# Patient Record
Sex: MALE | Race: WHITE | Employment: UNEMPLOYED | URBAN - METROPOLITAN AREA
[De-identification: names, ages, dates, MRNs, and addresses within clinical notes are randomized per-mention and may not be internally consistent; named-entity substitution may affect disease eponyms.]

---

## 2019-06-13 ENCOUNTER — HOSPITAL ENCOUNTER (INPATIENT)
Age: 64
LOS: 12 days | Discharge: HOME | DRG: 357 | End: 2019-06-26
Attending: EMERGENCY MEDICINE | Admitting: SURGERY
Payer: COMMERCIAL

## 2019-06-13 ENCOUNTER — APPOINTMENT (OUTPATIENT)
Dept: CT IMAGING | Age: 64
DRG: 357 | End: 2019-06-13
Attending: EMERGENCY MEDICINE
Payer: COMMERCIAL

## 2019-06-13 DIAGNOSIS — R10.84 ABDOMINAL PAIN, GENERALIZED: Primary | ICD-10-CM

## 2019-06-13 DIAGNOSIS — I71.40 ABDOMINAL AORTIC ANEURYSM (AAA) WITHOUT RUPTURE: ICD-10-CM

## 2019-06-13 DIAGNOSIS — K56.609 SMALL BOWEL OBSTRUCTION (HCC): ICD-10-CM

## 2019-06-13 PROBLEM — D72.829 LEUKOCYTOSIS: Status: ACTIVE | Noted: 2019-06-13

## 2019-06-13 PROBLEM — K52.9 ENTERITIS: Status: ACTIVE | Noted: 2019-06-13

## 2019-06-13 LAB
ALBUMIN SERPL-MCNC: 4.2 G/DL (ref 3.2–4.6)
ALBUMIN/GLOB SERPL: 1.5 {RATIO} (ref 1.2–3.5)
ALP SERPL-CCNC: 85 U/L (ref 50–136)
ALT SERPL-CCNC: 23 U/L (ref 12–65)
ANION GAP SERPL CALC-SCNC: 5 MMOL/L (ref 7–16)
AST SERPL-CCNC: 13 U/L (ref 15–37)
BASOPHILS # BLD: 0.1 K/UL (ref 0–0.2)
BASOPHILS NFR BLD: 1 % (ref 0–2)
BILIRUB SERPL-MCNC: 0.4 MG/DL (ref 0.2–1.1)
BUN SERPL-MCNC: 20 MG/DL (ref 8–23)
CALCIUM SERPL-MCNC: 9 MG/DL (ref 8.3–10.4)
CHLORIDE SERPL-SCNC: 106 MMOL/L (ref 98–107)
CO2 SERPL-SCNC: 30 MMOL/L (ref 21–32)
CREAT SERPL-MCNC: 1.08 MG/DL (ref 0.8–1.5)
DIFFERENTIAL METHOD BLD: ABNORMAL
EOSINOPHIL # BLD: 0.2 K/UL (ref 0–0.8)
EOSINOPHIL NFR BLD: 2 % (ref 0.5–7.8)
ERYTHROCYTE [DISTWIDTH] IN BLOOD BY AUTOMATED COUNT: 13.4 % (ref 11.9–14.6)
GLOBULIN SER CALC-MCNC: 2.8 G/DL (ref 2.3–3.5)
GLUCOSE SERPL-MCNC: 96 MG/DL (ref 65–100)
HCT VFR BLD AUTO: 46.1 % (ref 41.1–50.3)
HGB BLD-MCNC: 14.7 G/DL (ref 13.6–17.2)
IMM GRANULOCYTES # BLD AUTO: 0.1 K/UL (ref 0–0.5)
IMM GRANULOCYTES NFR BLD AUTO: 1 % (ref 0–5)
LYMPHOCYTES # BLD: 2.2 K/UL (ref 0.5–4.6)
LYMPHOCYTES NFR BLD: 17 % (ref 13–44)
MCH RBC QN AUTO: 30.6 PG (ref 26.1–32.9)
MCHC RBC AUTO-ENTMCNC: 31.9 G/DL (ref 31.4–35)
MCV RBC AUTO: 96 FL (ref 79.6–97.8)
MONOCYTES # BLD: 1 K/UL (ref 0.1–1.3)
MONOCYTES NFR BLD: 8 % (ref 4–12)
NEUTS SEG # BLD: 9.2 K/UL (ref 1.7–8.2)
NEUTS SEG NFR BLD: 72 % (ref 43–78)
NRBC # BLD: 0 K/UL (ref 0–0.2)
PLATELET # BLD AUTO: 283 K/UL (ref 150–450)
PMV BLD AUTO: 10.6 FL (ref 9.4–12.3)
POTASSIUM SERPL-SCNC: 4.1 MMOL/L (ref 3.5–5.1)
PROT SERPL-MCNC: 7 G/DL (ref 6.3–8.2)
RBC # BLD AUTO: 4.8 M/UL (ref 4.23–5.6)
SODIUM SERPL-SCNC: 141 MMOL/L (ref 136–145)
WBC # BLD AUTO: 12.7 K/UL (ref 4.3–11.1)

## 2019-06-13 PROCEDURE — 77030008771 HC TU NG SALEM SUMP -A

## 2019-06-13 PROCEDURE — 74011000258 HC RX REV CODE- 258: Performed by: EMERGENCY MEDICINE

## 2019-06-13 PROCEDURE — 99284 EMERGENCY DEPT VISIT MOD MDM: CPT | Performed by: EMERGENCY MEDICINE

## 2019-06-13 PROCEDURE — 0D9670Z DRAINAGE OF STOMACH WITH DRAINAGE DEVICE, VIA NATURAL OR ARTIFICIAL OPENING: ICD-10-PCS | Performed by: EMERGENCY MEDICINE

## 2019-06-13 PROCEDURE — 96374 THER/PROPH/DIAG INJ IV PUSH: CPT | Performed by: EMERGENCY MEDICINE

## 2019-06-13 PROCEDURE — 74011250636 HC RX REV CODE- 250/636: Performed by: EMERGENCY MEDICINE

## 2019-06-13 PROCEDURE — 96375 TX/PRO/DX INJ NEW DRUG ADDON: CPT | Performed by: EMERGENCY MEDICINE

## 2019-06-13 PROCEDURE — 74177 CT ABD & PELVIS W/CONTRAST: CPT

## 2019-06-13 PROCEDURE — 74011250636 HC RX REV CODE- 250/636: Performed by: SURGERY

## 2019-06-13 PROCEDURE — 74011636320 HC RX REV CODE- 636/320: Performed by: EMERGENCY MEDICINE

## 2019-06-13 PROCEDURE — 85025 COMPLETE CBC W/AUTO DIFF WBC: CPT

## 2019-06-13 PROCEDURE — 80053 COMPREHEN METABOLIC PANEL: CPT

## 2019-06-13 RX ORDER — SODIUM CHLORIDE 0.9 % (FLUSH) 0.9 %
10 SYRINGE (ML) INJECTION
Status: COMPLETED | OUTPATIENT
Start: 2019-06-13 | End: 2019-06-13

## 2019-06-13 RX ORDER — METRONIDAZOLE 500 MG/100ML
500 INJECTION, SOLUTION INTRAVENOUS EVERY 6 HOURS
Status: DISCONTINUED | OUTPATIENT
Start: 2019-06-13 | End: 2019-06-20

## 2019-06-13 RX ORDER — ONDANSETRON 2 MG/ML
4 INJECTION INTRAMUSCULAR; INTRAVENOUS
Status: COMPLETED | OUTPATIENT
Start: 2019-06-13 | End: 2019-06-13

## 2019-06-13 RX ORDER — MORPHINE SULFATE 4 MG/ML
4 INJECTION INTRAVENOUS
Status: COMPLETED | OUTPATIENT
Start: 2019-06-13 | End: 2019-06-13

## 2019-06-13 RX ORDER — CIPROFLOXACIN 2 MG/ML
400 INJECTION, SOLUTION INTRAVENOUS EVERY 12 HOURS
Status: DISCONTINUED | OUTPATIENT
Start: 2019-06-13 | End: 2019-06-20

## 2019-06-13 RX ADMIN — ONDANSETRON 4 MG: 2 INJECTION INTRAMUSCULAR; INTRAVENOUS at 22:29

## 2019-06-13 RX ADMIN — IOPAMIDOL 100 ML: 755 INJECTION, SOLUTION INTRAVENOUS at 22:35

## 2019-06-13 RX ADMIN — MORPHINE SULFATE 4 MG: 4 INJECTION INTRAVENOUS at 22:30

## 2019-06-13 RX ADMIN — METRONIDAZOLE 500 MG: 500 INJECTION, SOLUTION INTRAVENOUS at 23:58

## 2019-06-13 RX ADMIN — DIATRIZOATE MEGLUMINE AND DIATRIZOATE SODIUM 15 ML: 600; 100 SOLUTION ORAL; RECTAL at 21:37

## 2019-06-13 RX ADMIN — SODIUM CHLORIDE 1000 ML: 900 INJECTION, SOLUTION INTRAVENOUS at 21:37

## 2019-06-13 RX ADMIN — Medication 10 ML: at 22:35

## 2019-06-14 ENCOUNTER — APPOINTMENT (OUTPATIENT)
Dept: GENERAL RADIOLOGY | Age: 64
DRG: 357 | End: 2019-06-14
Attending: SURGERY
Payer: COMMERCIAL

## 2019-06-14 PROBLEM — K44.9 HIATAL HERNIA: Status: ACTIVE | Noted: 2019-06-14

## 2019-06-14 PROBLEM — I71.40 ABDOMINAL AORTIC ANEURYSM (AAA) WITHOUT RUPTURE: Status: ACTIVE | Noted: 2019-06-14

## 2019-06-14 LAB
ANION GAP SERPL CALC-SCNC: 5 MMOL/L (ref 7–16)
BUN SERPL-MCNC: 14 MG/DL (ref 8–23)
CALCIUM SERPL-MCNC: 8.3 MG/DL (ref 8.3–10.4)
CHLORIDE SERPL-SCNC: 106 MMOL/L (ref 98–107)
CO2 SERPL-SCNC: 28 MMOL/L (ref 21–32)
CREAT SERPL-MCNC: 0.93 MG/DL (ref 0.8–1.5)
CRP SERPL-MCNC: 0.6 MG/DL (ref 0–0.9)
ERYTHROCYTE [DISTWIDTH] IN BLOOD BY AUTOMATED COUNT: 13.5 % (ref 11.9–14.6)
GLUCOSE SERPL-MCNC: 114 MG/DL (ref 65–100)
HCT VFR BLD AUTO: 46.2 % (ref 41.1–50.3)
HGB BLD-MCNC: 14.7 G/DL (ref 13.6–17.2)
LACTATE SERPL-SCNC: 0.8 MMOL/L (ref 0.4–2)
MCH RBC QN AUTO: 30.6 PG (ref 26.1–32.9)
MCHC RBC AUTO-ENTMCNC: 31.8 G/DL (ref 31.4–35)
MCV RBC AUTO: 96 FL (ref 79.6–97.8)
NRBC # BLD: 0 K/UL (ref 0–0.2)
PLATELET # BLD AUTO: 258 K/UL (ref 150–450)
PMV BLD AUTO: 10.5 FL (ref 9.4–12.3)
POTASSIUM SERPL-SCNC: 4.2 MMOL/L (ref 3.5–5.1)
RBC # BLD AUTO: 4.81 M/UL (ref 4.23–5.6)
SODIUM SERPL-SCNC: 139 MMOL/L (ref 136–145)
WBC # BLD AUTO: 14.2 K/UL (ref 4.3–11.1)

## 2019-06-14 PROCEDURE — 77030032490 HC SLV COMPR SCD KNE COVD -B

## 2019-06-14 PROCEDURE — 74011250637 HC RX REV CODE- 250/637: Performed by: SURGERY

## 2019-06-14 PROCEDURE — 85027 COMPLETE CBC AUTOMATED: CPT

## 2019-06-14 PROCEDURE — 86580 TB INTRADERMAL TEST: CPT | Performed by: SURGERY

## 2019-06-14 PROCEDURE — 74018 RADEX ABDOMEN 1 VIEW: CPT

## 2019-06-14 PROCEDURE — 77030027138 HC INCENT SPIROMETER -A

## 2019-06-14 PROCEDURE — 74011250636 HC RX REV CODE- 250/636: Performed by: SURGERY

## 2019-06-14 PROCEDURE — 36415 COLL VENOUS BLD VENIPUNCTURE: CPT

## 2019-06-14 PROCEDURE — 86140 C-REACTIVE PROTEIN: CPT

## 2019-06-14 PROCEDURE — 83605 ASSAY OF LACTIC ACID: CPT

## 2019-06-14 PROCEDURE — 65270000029 HC RM PRIVATE

## 2019-06-14 PROCEDURE — 74011000302 HC RX REV CODE- 302: Performed by: SURGERY

## 2019-06-14 PROCEDURE — 77030033269 HC SLV COMPR SCD KNE2 CARD -B

## 2019-06-14 PROCEDURE — 80048 BASIC METABOLIC PNL TOTAL CA: CPT

## 2019-06-14 RX ORDER — DEXTROSE, SODIUM CHLORIDE, AND POTASSIUM CHLORIDE 5; .45; .15 G/100ML; G/100ML; G/100ML
100 INJECTION INTRAVENOUS CONTINUOUS
Status: DISCONTINUED | OUTPATIENT
Start: 2019-06-14 | End: 2019-06-18

## 2019-06-14 RX ORDER — MORPHINE SULFATE 10 MG/ML
2-4 INJECTION, SOLUTION INTRAMUSCULAR; INTRAVENOUS
Status: DISCONTINUED | OUTPATIENT
Start: 2019-06-14 | End: 2019-06-26 | Stop reason: HOSPADM

## 2019-06-14 RX ORDER — ACETAMINOPHEN 500 MG
1000 TABLET ORAL
Status: DISCONTINUED | OUTPATIENT
Start: 2019-06-14 | End: 2019-06-26 | Stop reason: HOSPADM

## 2019-06-14 RX ORDER — ONDANSETRON 2 MG/ML
4 INJECTION INTRAMUSCULAR; INTRAVENOUS
Status: DISCONTINUED | OUTPATIENT
Start: 2019-06-14 | End: 2019-06-26 | Stop reason: HOSPADM

## 2019-06-14 RX ORDER — ENOXAPARIN SODIUM 100 MG/ML
40 INJECTION SUBCUTANEOUS EVERY 24 HOURS
Status: DISCONTINUED | OUTPATIENT
Start: 2019-06-15 | End: 2019-06-26 | Stop reason: HOSPADM

## 2019-06-14 RX ORDER — CETIRIZINE HCL 10 MG
10 TABLET ORAL DAILY
COMMUNITY

## 2019-06-14 RX ORDER — MORPHINE SULFATE 10 MG/ML
2-6 INJECTION, SOLUTION INTRAMUSCULAR; INTRAVENOUS
Status: DISCONTINUED | OUTPATIENT
Start: 2019-06-14 | End: 2019-06-14

## 2019-06-14 RX ORDER — FAMOTIDINE 10 MG/ML
20 INJECTION INTRAVENOUS EVERY 12 HOURS
Status: DISCONTINUED | OUTPATIENT
Start: 2019-06-14 | End: 2019-06-18

## 2019-06-14 RX ADMIN — METRONIDAZOLE 500 MG: 500 INJECTION, SOLUTION INTRAVENOUS at 11:03

## 2019-06-14 RX ADMIN — METRONIDAZOLE 500 MG: 500 INJECTION, SOLUTION INTRAVENOUS at 06:10

## 2019-06-14 RX ADMIN — CHLORASEPTIC 1 SPRAY: 1.5 LIQUID ORAL at 08:47

## 2019-06-14 RX ADMIN — MORPHINE SULFATE 2 MG: 10 INJECTION, SOLUTION INTRAMUSCULAR; INTRAVENOUS at 15:25

## 2019-06-14 RX ADMIN — CIPROFLOXACIN 400 MG: 2 INJECTION, SOLUTION INTRAVENOUS at 13:55

## 2019-06-14 RX ADMIN — DEXTROSE MONOHYDRATE, SODIUM CHLORIDE, AND POTASSIUM CHLORIDE 100 ML/HR: 50; 4.5; 1.49 INJECTION, SOLUTION INTRAVENOUS at 02:44

## 2019-06-14 RX ADMIN — FAMOTIDINE 20 MG: 10 INJECTION, SOLUTION INTRAVENOUS at 02:45

## 2019-06-14 RX ADMIN — ONDANSETRON 4 MG: 2 INJECTION INTRAMUSCULAR; INTRAVENOUS at 08:37

## 2019-06-14 RX ADMIN — MORPHINE SULFATE 2 MG: 10 INJECTION, SOLUTION INTRAMUSCULAR; INTRAVENOUS at 19:07

## 2019-06-14 RX ADMIN — TUBERCULIN PURIFIED PROTEIN DERIVATIVE 5 UNITS: 5 INJECTION, SOLUTION INTRADERMAL at 02:46

## 2019-06-14 RX ADMIN — FAMOTIDINE 20 MG: 10 INJECTION, SOLUTION INTRAVENOUS at 13:55

## 2019-06-14 RX ADMIN — CIPROFLOXACIN 400 MG: 2 INJECTION, SOLUTION INTRAVENOUS at 02:45

## 2019-06-14 RX ADMIN — DEXTROSE MONOHYDRATE, SODIUM CHLORIDE, AND POTASSIUM CHLORIDE 100 ML/HR: 50; 4.5; 1.49 INJECTION, SOLUTION INTRAVENOUS at 11:04

## 2019-06-14 RX ADMIN — METRONIDAZOLE 500 MG: 500 INJECTION, SOLUTION INTRAVENOUS at 17:15

## 2019-06-14 NOTE — ED PROVIDER NOTES
29-year-old white male with no ongoing medical problems presents with lower abdominal discomfort onset around 2:30 today. Reports he has not eaten anything since breakfast.  Aggravated by palpation and movement. No vomiting or fever. Was seen at urgent care and sent here for imaging. The history is provided by the patient. Abdominal Pain    Pertinent negatives include no fever, no diarrhea, no nausea, no vomiting, no dysuria, no headaches, no chest pain and no back pain.         Past Medical History:   Diagnosis Date    Arteritis, unspecified ([facility] [address]     Asthma     Body mass index between 19-24, adult     Bronchiolitis     CA in situ skin     Chronic airway obstruction, not elsewhere classified ([facility] [address] 5/26/2015    Coccyx disorder     Colon polyps     Crushing injury     Dermatitis     dermatitis/urticaria    ED (erectile dysfunction)     Esophageal reflux 5/26/2015    Herpes zoster     Impotence of organic origin     Lumbar back pain     Pain in limb     Reflux esophagitis     Skin disorder     Tobacco use disorder 5/26/2015       Past Surgical History:   Procedure Laterality Date    HX HEART CATHETERIZATION  1999    HX HEMORRHOIDECTOMY  1999         Family History:   Problem Relation Age of Onset    Stroke Father         CVA    Heart Disease Father     Cancer Mother         Lung       Social History     Socioeconomic History    Marital status:      Spouse name: Not on file    Number of children: Not on file    Years of education: Not on file    Highest education level: Not on file   Occupational History    Not on file   Social Needs    Financial resource strain: Not on file    Food insecurity:     Worry: Not on file     Inability: Not on file    Transportation needs:     Medical: Not on file     Non-medical: Not on file   Tobacco Use    Smoking status: Current Every Day Smoker     Packs/day: 1.00     Years: 42.00     Pack years: 42.00    Smokeless tobacco: Former User Quit date: 1/1/1991   Substance and Sexual Activity    Alcohol use: No    Drug use: No    Sexual activity: Not on file   Lifestyle    Physical activity:     Days per week: Not on file     Minutes per session: Not on file    Stress: Not on file   Relationships    Social connections:     Talks on phone: Not on file     Gets together: Not on file     Attends [employer] witness service: Not on file     Active member of club or organization: Not on file     Attends meetings of clubs or organizations: Not on file     Relationship status: Not on file    Intimate partner violence:     Fear of current or ex partner: Not on file     Emotionally abused: Not on file     Physically abused: Not on file     Forced sexual activity: Not on file   Other Topics Concern    Not on file   Social History Narrative    Not on file         ALLERGIES: Patient has no known allergies. Review of Systems   Constitutional: Negative for fever. HENT: Negative for congestion. Respiratory: Negative for cough. Cardiovascular: Negative for chest pain. Gastrointestinal: Positive for abdominal pain. Negative for diarrhea, nausea and vomiting. Genitourinary: Negative for dysuria. Musculoskeletal: Negative for back pain and neck pain. Skin: Negative for rash. Neurological: Negative for headaches. Vitals:    06/13/19 2013   BP: 162/85   Pulse: 61   Resp: 18   Temp: 97.6 °F (36.4 °C)   SpO2: 97%   Weight: 80.3 kg (177 lb)   Height: 6' 1\" (1.854 m)            Physical Exam   Constitutional: He is oriented to person, place, and time. He appears well-developed and well-nourished. No distress. HENT:   Head: Normocephalic and atraumatic. Mouth/Throat: Oropharynx is clear and moist.   Eyes: Pupils are equal, round, and reactive to light. Conjunctivae are normal. No scleral icterus. Neck: Normal range of motion. Neck supple. Cardiovascular: Normal rate and regular rhythm.    Pulmonary/Chest: Effort normal and breath sounds normal. Abdominal: Soft. There is tenderness in the right lower quadrant, suprapubic area and left lower quadrant. There is guarding. There is no rigidity and no rebound. Musculoskeletal: Normal range of motion. He exhibits no edema. Neurological: He is alert and oriented to person, place, and time. Skin: Skin is warm and dry. Psychiatric: He has a normal mood and affect. His behavior is normal.   Nursing note and vitals reviewed. MDM  Number of Diagnoses or Management Options  Diagnosis management comments: Lab work shows mild leukocytosis 12.7 but is otherwise unremarkable. CT scan shows 15 cm segment of small bowel with thickened wall and a mesenteric hyperemia. Findings possibly due to acute enteritis. Her proximal small bowel loops that are distended suggesting obstruction. No abscess or free air. Patient initially declining pain medicine however after drinking contrast began having significant pain. He was treated with morphine and Zofran and IV fluids. He is still uncomfortable. On repeat exam he still has some guarding. No rebound or rigidity. Findings discussed with surgery.        Amount and/or Complexity of Data Reviewed  Clinical lab tests: ordered and reviewed  Tests in the radiology section of CPT®: ordered and reviewed  Independent visualization of images, tracings, or specimens: yes    Risk of Complications, Morbidity, and/or Mortality  Presenting problems: moderate  Diagnostic procedures: moderate  Management options: moderate           Procedures

## 2019-06-14 NOTE — PROGRESS NOTES
Chart screened by  for discharge planning. No needs identified at this time. Please consult  if any new issues arise.     Care Management Interventions  Transition of Care Consult (CM Consult): Discharge Planning  Discharge Location  Discharge Placement: Unable to determine at this time

## 2019-06-14 NOTE — ED TRIAGE NOTES
C/o abdominal pain, located at umbilicus. Onset approx 1500 today. Denies n/v/d. Denies fever or chills. Denies urinary symptoms. Denies blood to stool. Denies hx of same. Reports as intermittent since onset. Denies attempting pain meds pta. Seen at [facility] p[facility], sent to ed.

## 2019-06-14 NOTE — PROGRESS NOTES
TRANSFER - IN REPORT:    Verbal report received from Yuliya Phan RN on Elchan Mow  being received from ED for routine progression of care      Report consisted of patients Situation, Background, Assessment and   Recommendations(SBAR). Information from the following report(s) SBAR, Kardex, ED Summary, [facility] and Recent Results was reviewed with the receiving nurse. Opportunity for questions and clarification was provided. Assessment will be completed upon patients arrival to unit and care assumed.

## 2019-06-14 NOTE — CONSULTS
[facility] Consult Note       Primary GI Physician: None    Referring Provider:  Dr Fidel Norwood Date:  6/14/2019    Admit Date:  6/13/2019    Chief Complaint:  Small bowel enteritis with obstruction    Subjective:     History of Present Illness:  Patient is a 58 y.o. male with PMH including but not limited to COPD, skin ca, colon polyps and GERD who is seen in consultation at the request of Dr. Chantelle Dodge for above. He reports normal daily stools until the past several days and then only moving his bowels every other day. He reports 2 prior colonoscopies the last of which was 10 years ago with 2 colon polyps. He reported sudden onset severe pain beginning this am about 2 am.  He had no nausea until he drank CT contrast.  Denies BRRB or melena. GERD well controlled on Omeprazole 40 mg daily. Denies family hx of GI malignancy or IBD. Has stable COPD not on home oxygen. Card cath in 1999 was negative. Labs notable only for elevated WBC of 12.7. Has NG in place on LIS. On Cipro and Flagyl. CT as below with 15 cm segment of SB wall thickening in the pelvis. 6/13/19 CT abd/pelvis with PO and IV contrast  - Liver: Within normal limits. - Gallbladder and bile ducts: Within normal limits. - Spleen: Within normal limits. - Urinary tract: Within normal limits. - Adrenals: Within normal limits. - Pancreas: Within normal limits. - Gastrointestinal tract: There is an approximate 15 cm long segment of small  bowel wall thickening in the pelvis, in the region of the distal ileum, with  mesenteric hyperemia and trace free pelvic fluid. Findings are consistent with  acute enteritis. The more proximal small bowel loops are mildly distended,  suggesting a component of underlying obstruction as well. The terminal ileum has  a normal appearance. The colon is unremarkable. The appendix is not definitely  identified.  No focal abscess or free air is identified.  - Retroperitoneum: There is a 3.2 cm fusiform infrarenal abdominal aortic  aneurysm, without evidence of dissection or rupture. - Peritoneal cavity and abdominal wall: Within normal limits. - Pelvis: Within normal limits. - Spine/bones: No acute process. - Other comments: The lung bases are clear. There is a small hiatal hernia.     IMPRESSION:   1. Acute enteritis involving an approximately 15 cm long segment of pelvic small  bowel, with an associated mild small bowel obstruction. There is trace free  fluid in the pelvis as well.  2. 3.2 cm infrarenal abdominal aortic aneurysm. 3. Small hiatal hernia        PMH:  Past Medical History:   Diagnosis Date    Arteritis, unspecified ([facility] [address]     Asthma     Body mass index between 19-24, adult     Bronchiolitis     CA in situ skin     Chronic airway obstruction, not elsewhere classified ([facility] [address] 5/26/2015    Coccyx disorder     Colon polyps     Crushing injury     Dermatitis     dermatitis/urticaria    ED (erectile dysfunction)     Esophageal reflux 5/26/2015    Herpes zoster     Impotence of organic origin     Lumbar back pain     Pain in limb     Reflux esophagitis     Skin disorder     Tobacco use disorder 5/26/2015       PSH:  Past Surgical History:   Procedure Laterality Date    HX HEART CATHETERIZATION  1999    HX HEMORRHOIDECTOMY  1999       Allergies:  No Known Allergies    Home Medications:  Prior to Admission medications    Medication Sig Start Date End Date Taking? Authorizing Provider   guaiFENesin-codeine (ROBITUSSIN AC) 100-10 mg/5 mL solution 1-2 tsp q 4-6 hours prn cough. May cause drowsiness 12/30/16   Adam Hansen PA-C   fluticasone [facility]) 50 mcg/actuation nasal spray 1-2 sprays EN daily 12/30/16   Eugene Sera VIVIANA PARRISH   albuterol (PROVENTIL HFA, VENTOLIN HFA, PROAIR HFA) 90 mcg/actuation inhaler 1-2 puffs q 4-6 hours prn wheezing 9/2/16   Eugene Sera VIVIANA PARRISH   omeprazole (PRILOSEC) 40 mg capsule Take 40 mg by mouth daily.     Provider, Historical   aspirin (ASPIRIN CHILDRENS) 81 mg chewable tablet Take 81 mg by mouth daily. Provider, Historical       Hospital Medications:  Current Facility-Administered Medications   Medication Dose Route Frequency    famotidine (PF) (PEPCID) injection 20 mg  20 mg IntraVENous Q12H    [START ON 6/15/2019] enoxaparin (LOVENOX) injection 40 mg  40 mg SubCUTAneous Q24H    acetaminophen (TYLENOL) tablet 1,000 mg  1,000 mg Oral Q6H PRN    ondansetron (ZOFRAN) injection 4 mg  4 mg IntraVENous Q4H PRN    dextrose 5% - 0.45% NaCl with KCl 20 mEq/L infusion  100 mL/hr IntraVENous CONTINUOUS    morphine 10 mg/ml injection 2-4 mg  2-4 mg IntraVENous Q1H PRN    phenol throat spray (CHLORASEPTIC) 1 Spray  1 Spray Oral PRN    tuberculin injection 5 Units  5 Units IntraDERMal ONCE    sodium chloride 0.9 % bolus infusion 100 mL  100 mL IntraVENous RAD ONCE    ciprofloxacin (CIPRO) 400 mg in D5W IVPB (premix)  400 mg IntraVENous Q12H    metroNIDAZOLE (FLAGYL) IVPB premix 500 mg  500 mg IntraVENous Q6H       Social History:  Social History     Tobacco Use    Smoking status: Current Every Day Smoker     Packs/day: 1.00     Years: 42.00     Pack years: 42.00    Smokeless tobacco: Former User     Quit date: 1/1/1991   Substance Use Topics    Alcohol use: No       Pt denies any history of drug use, blood transfusions, or tattoos. Family History:  Family History   Problem Relation Age of Onset    Stroke Father         CVA    Heart Disease Father     Cancer Mother         Lung       Review of Systems:  A detailed 10 system ROS is obtained, with pertinent positives as listed above. All others are negative. Diet:  NPO    Objective:     Physical Exam:  Vitals:  Visit Vitals  /80   Pulse 70   Temp 99.4 °F (37.4 °C)   Resp 16   Ht 6' 1\" (1.854 m)   Wt 80.3 kg (177 lb)   SpO2 92%   BMI 23.35 kg/m²     Gen:  Pt is alert, cooperative, no acute distress  Skin:  Extremities and face reveal no rashes.    HEENT: Sclerae anicteric. Extra-occular muscles are intact. No oral ulcers. No abnormal pigmentation of the lips. The neck is supple. Cardiovascular: Regular rate and rhythm. No murmurs, gallops, or rubs. Respiratory:  Comfortable breathing with no accessory muscle use. Clear breath sounds anteriorly with no wheezes, rales, or rhonchi. GI:  Abdomen nondistended, soft. Mod ttp in mid and upper abd. Very hypoactive BS. Rectal:  Deferred  Musculoskeletal:  No pitting edema of the lower legs. Neurological:  Gross memory appears intact. Patient is alert and oriented. Psychiatric:  Mood appears appropriate with judgement intact. Laboratory:    Recent Labs     06/13/19 2016   WBC 12.7*   HGB 14.7   HCT 46.1      MCV 96.0      K 4.1      CO2 30   BUN 20   CREA 1.08   CA 9.0   GLU 96   AP 85   SGOT 13*   ALT 23   TBILI 0.4   ALB 4.2   TP 7.0          Assessment:     Principal Problem:    Small Bowel Enteritis on CT (6/13/2019)    Active Problems:    Tobacco use disorder (5/26/2015)      Generalized abdominal pain (6/13/2019)      SBO (small bowel obstruction) ([facility] [address] (6/13/2019)      Leukocytosis (6/13/2019)      3.2cm AAA on CT 6/13/19 (6/14/2019)      Hiatal hernia (6/14/2019)      58 y.o. male with PMH including but not limited to COPD, skin ca, colon polyps and GERD admitted w sudden onset mid abd pain w CT w 15 cm segment of SB wall thickening in the pelvis, elevated WBC. Last colo 8 yo w polyps. No diarrhea, BRRB or tarry stools prior to admit    Plan:     - Supportive care of SBO with NG, NPO  - Elevated ABD with CIpro and Flagyl  - Will check inflammatory markers  - Could be acute infection, inflammation (IBD), ischemic    Lin Mcdermott, MAEVE  Patient is seen and examined in collaboration with Dr. Vito Jimenez. Assessment and plan as per Dr. Xenia Reardon. I have seen and examined this patient, and agree with above assessment and plan.     Will eventually require colonoscopy to rule out Crohn's disease.  This will be after SBO resolves to allow adequate bowel prep, Possibly even as outpatient   Continue supportive care in the meantime as above   Small flatus today, feeling somewhat improved     David Jones MD

## 2019-06-14 NOTE — PROGRESS NOTES
06/14/19 0120   Dual Skin Pressure Injury Assessment   Dual Skin Pressure Injury Assessment WDL   Second Care Provider (Based on [address] [place] [address]) Madelaine Martin, RN    Skin Integumentary   Skin Integumentary (WDL) WDL   Skin Condition/Temp Dry;Flaky; Warm;Rash   Skin Integrity Intact

## 2019-06-14 NOTE — H&P
H&P/Consult Note/Progress Note/Office Note:   Jany Benton  MRN: [id number]  :1955  Age:63 y.o.    HPI: Jany Benton is a 58 y.o. male who came to the ER with a 1 day h/o progressive, constant, severe, non-radiating, generalized abd pain  Nothing made pain better or worse. No associated N/V/F/C or diarrhea  Normally has 1-2 solid BMs qday but lately every other day    WBC 12.7k  Surgery consulted after CT showed acute enteritis with component of underlying obstrauction  No h/o Crohn's or lymphoma  He is s/p appy in 19 CT abd/pelvis with PO and IV contrast  - Liver: Within normal limits. - Gallbladder and bile ducts: Within normal limits. - Spleen: Within normal limits. - Urinary tract: Within normal limits. - Adrenals: Within normal limits. - Pancreas: Within normal limits. - Gastrointestinal tract: There is an approximate 15 cm long segment of small  bowel wall thickening in the pelvis, in the region of the distal ileum, with  mesenteric hyperemia and trace free pelvic fluid. Findings are consistent with  acute enteritis. The more proximal small bowel loops are mildly distended,  suggesting a component of underlying obstruction as well. The terminal ileum has  a normal appearance. The colon is unremarkable. The appendix is not definitely  identified. No focal abscess or free air is identified.  - Retroperitoneum: There is a 3.2 cm fusiform infrarenal abdominal aortic  aneurysm, without evidence of dissection or rupture. - Peritoneal cavity and abdominal wall: Within normal limits. - Pelvis: Within normal limits. - Spine/bones: No acute process. - Other comments: The lung bases are clear. There is a small hiatal hernia. IMPRESSION:   1. Acute enteritis involving an approximately 15 cm long segment of pelvic small  bowel, with an associated mild small bowel obstruction.  There is trace free  fluid in the pelvis as well.  2. 3.2 cm infrarenal abdominal aortic aneurysm. 3. Small hiatal hernia          Past Medical History:   Diagnosis Date    Arteritis, unspecified ([facility]     Asthma     Body mass index between 19-24, adult     Bronchiolitis     CA in situ skin     Chronic airway obstruction, not elsewhere classified ([facility] 5/26/2015    Coccyx disorder     Colon polyps     Crushing injury     Dermatitis     dermatitis/urticaria    ED (erectile dysfunction)     Esophageal reflux 5/26/2015    Herpes zoster     Impotence of organic origin     Lumbar back pain     Pain in limb     Reflux esophagitis     Skin disorder     Tobacco use disorder 5/26/2015     Past Surgical History:   Procedure Laterality Date    HX HEART CATHETERIZATION  1999    HX HEMORRHOIDECTOMY  1999     Current Facility-Administered Medications   Medication Dose Route Frequency    sodium chloride 0.9 % bolus infusion 100 mL  100 mL IntraVENous RAD ONCE    ciprofloxacin (CIPRO) 400 mg in D5W IVPB (premix)  400 mg IntraVENous Q12H    metroNIDAZOLE (FLAGYL) IVPB premix 500 mg  500 mg IntraVENous Q6H     Current Outpatient Medications   Medication Sig    guaiFENesin-codeine (ROBITUSSIN AC) 100-10 mg/5 mL solution 1-2 tsp q 4-6 hours prn cough. May cause drowsiness    fluticasone (FLONASE) 50 mcg/actuation nasal spray 1-2 sprays EN daily    albuterol (PROVENTIL HFA, VENTOLIN HFA, PROAIR HFA) 90 mcg/actuation inhaler 1-2 puffs q 4-6 hours prn wheezing    omeprazole (PRILOSEC) 40 mg capsule Take 40 mg by mouth daily.  aspirin (ASPIRIN CHILDRENS) 81 mg chewable tablet Take 81 mg by mouth daily. Patient has no known allergies.   Social History     Socioeconomic History    Marital status:      Spouse name: Not on file    Number of children: Not on file    Years of education: Not on file    Highest education level: Not on file   Tobacco Use    Smoking status: Current Every Day Smoker     Packs/day: 1.00     Years: 42.00     Pack years: 42.00    Smokeless tobacco: Former User Quit date: 1/1/1991   Substance and Sexual Activity    Alcohol use: No    Drug use: No     Social History     Tobacco Use   Smoking Status Current Every Day Smoker    Packs/day: 1.00    Years: 42.00    Pack years: 42.00   Smokeless Tobacco Former User    Quit date: 1/1/1991     Family History   Problem Relation Age of Onset    Stroke Father         CVA    Heart Disease Father     Cancer Mother         Lung     ROS: The patient has no difficulty with chest pain or shortness of breath. No fever or chills. Comprehensive review of systems was otherwise unremarkable except as noted above. Physical Exam:   Visit Vitals  /71 (BP 1 Location: Left arm, BP Patient Position: At rest)   Pulse 70   Temp 97.6 °F (36.4 °C)   Resp 16   Ht 6' 1\" (1.854 m)   Wt 177 lb (80.3 kg)   SpO2 97%   BMI 23.35 kg/m²     Vitals:    06/13/19 2013 06/13/19 2259   BP: 162/85 147/71   Pulse: 61 70   Resp: 18 16   Temp: 97.6 °F (36.4 °C)    SpO2: 97% 97%   Weight: 177 lb (80.3 kg)    Height: 6' 1\" (1.854 m)      No intake/output data recorded. No intake/output data recorded. Constitutional: Alert, oriented, cooperative patient in no acute distress; appears stated age    Eyes:Sclera are clear. EOMs intact  ENMT: no external lesions gross hearing normal; no obvious neck masses, no ear or lip lesions, nares normal  CV: RRR. Normal perfusion  Resp: No JVD. Breathing is  non-labored; no audible wheezing. GI: +distended, no palp mass; diffuse tenderness     Musculoskeletal: unremarkable with normal function. No embolic signs or cyanosis.    Neuro:  Oriented; moves all 4; no focal deficits  Psychiatric: normal affect and mood, no memory impairment    Recent vitals (if inpt):  Patient Vitals for the past 24 hrs:   BP Temp Pulse Resp SpO2 Height Weight   06/13/19 2259 147/71  70 16 97 %     06/13/19 2013 162/85 97.6 °F (36.4 °C) 61 18 97 % 6' 1\" (1.854 m) 177 lb (80.3 kg)       Labs:  Recent Labs     06/13/19 2016   WBC 12.7*   HGB 14.7         K 4.1      CO2 30   BUN 20   CREA 1.08   GLU 96   TBILI 0.4   SGOT 13*   ALT 23   AP 85       Lab Results   Component Value Date/Time    WBC 12.7 (H) 06/13/2019 08:16 PM    HGB 14.7 06/13/2019 08:16 PM    PLATELET 464 16/15/1607 08:16 PM    Sodium 141 06/13/2019 08:16 PM    Potassium 4.1 06/13/2019 08:16 PM    Chloride 106 06/13/2019 08:16 PM    CO2 30 06/13/2019 08:16 PM    BUN 20 06/13/2019 08:16 PM    Creatinine 1.08 06/13/2019 08:16 PM    Glucose 96 06/13/2019 08:16 PM    Bilirubin, total 0.4 06/13/2019 08:16 PM    AST (SGOT) 13 (L) 06/13/2019 08:16 PM    ALT (SGPT) 23 06/13/2019 08:16 PM    Alk. phosphatase 85 06/13/2019 08:16 PM       CT Results  (Last 48 hours)               06/13/19 2235  CT ABD PELV W CONT Final result    Impression:  IMPRESSION:    1. Acute enteritis involving an approximately 15 cm long segment of pelvic small   bowel, with an associated mild small bowel obstruction. There is trace free   fluid in the pelvis as well.   2. 3.2 cm infrarenal abdominal aortic aneurysm. 3. Small hiatal hernia. Narrative:  EXAM: CT abdomen and pelvis with IV contrast.       INDICATION: Abdominal pain. COMPARISON: None. TECHNIQUE: Axial CT images of the abdomen and pelvis were obtained after the   intravenous injection of 100 mL Isovue 370 CT contrast. Oral contrast was   administered as well. Radiation dose reduction techniques were used for this   study. Our CT scanners use one or all of the following:  Automated exposure   control, adjustment of the mA or kV according to patient size, iterative   reconstruction. FINDINGS:       - Liver: Within normal limits. - Gallbladder and bile ducts: Within normal limits. - Spleen: Within normal limits. - Urinary tract: Within normal limits. - Adrenals: Within normal limits. - Pancreas: Within normal limits. - Gastrointestinal tract:  There is an approximate 15 cm long segment of small bowel wall thickening in the pelvis, in the region of the distal ileum, with   mesenteric hyperemia and trace free pelvic fluid. Findings are consistent with   acute enteritis. The more proximal small bowel loops are mildly distended,   suggesting a component of underlying obstruction as well. The terminal ileum has   a normal appearance. The colon is unremarkable. The appendix is not definitely   identified. No focal abscess or free air is identified.   - Retroperitoneum: There is a 3.2 cm fusiform infrarenal abdominal aortic   aneurysm, without evidence of dissection or rupture. - Peritoneal cavity and abdominal wall: Within normal limits. - Pelvis: Within normal limits. - Spine/bones: No acute process. - Other comments: The lung bases are clear. There is a small hiatal hernia. chest X-ray      I reviewed recent labs, recent radiologic studies, and pertinent records including other doctor notes if needed. I independently reviewed radiology images for studies I described above or studies I have ordered.    Admission date (for inpatients): 6/13/2019   * No surgery found *  * No surgery found *    ASSESSMENT/PLAN:  Problem List  Date Reviewed: 6/13/2019          Codes Class Noted    Generalized abdominal pain ICD-10-CM: R10.84  ICD-9-CM: 789.07  6/13/2019        * (Principal) Small Bowel Enteritis on CT ICD-10-CM: K52.9  ICD-9-CM: 558.9  6/13/2019        SBO (small bowel obstruction) ([facility] ICD-10-CM: Q49.198  ICD-9-CM: 560.9  6/13/2019        Leukocytosis ICD-10-CM: W05.789  ICD-9-CM: 288.60  6/13/2019        Tobacco use disorder ICD-10-CM: F17.200  ICD-9-CM: 305.1  5/26/2015        Esophageal reflux ICD-10-CM: K21.9  ICD-9-CM: 530.81  5/26/2015        Chronic airway obstruction, not elsewhere classified ICD-10-CM: J44.9  ICD-9-CM: 253  5/26/2015            Principal Problem:    Small Bowel Enteritis on CT (6/13/2019)    Active Problems:    Tobacco use disorder (5/26/2015)      Generalized abdominal pain (6/13/2019)      SBO (small bowel obstruction) ([address] (6/13/2019)      Leukocytosis (6/13/2019)       Small Bowel Enteritis with secondary SBO  DDx Ischemic, Infectious, Inflammatory  NPO/NGT/IVF/Bowel rest  IV Flagyl/Cipro for now  Stool cultures if diarrhea  GI consult in am for enteritis - possibility of Crohn's and will likely need outpt follow-up  Consider steroids - defer to GI    Abd pain  Prn pain meds    Leukocytosis  Follow    AAA  Reder to vascular surgery as outpt for follow-up        I have personally performed a face-to-face diagnostic evaluation and management  service on this patient. I have independently seen the patient. I have independently obtained the above history from the patient/family. I have independently examined the patient with above findings. I have independently reviewed data/labs for this patient and developed the above plan of care (MDM). Signed: Rajni Florez.  Madeleine Roa MD, FACS

## 2019-06-14 NOTE — ED NOTES
NG tube was short per radiology. NG tube advanced to 72, confirmatory xray reshot.  Ng tube now in proper place

## 2019-06-14 NOTE — PROGRESS NOTES
H&P/Consult Note/Progress Note/Office Note:   Constantine Schirmer  MRN: [id number]  :1955  Age:63 y.o.    HPI: Constantine Schirmer is a 58 y.o. male who came to the ER with a 1 day h/o progressive, constant, severe, non-radiating, generalized abd pain  Nothing made pain better or worse. No associated N/V/F/C or diarrhea  Normally has 1-2 solid BMs qday but lately BM only every other day  o h/o bloody diarrhea or sign diarrhea issues    WBC 12.7k  Surgery consulted after CT showed acute enteritis with component of underlying obstrauction  No h/o Crohn's or lymphoma  He is s/p appy in 1990s        19 CT abd/pelvis with PO and IV contrast  - Liver: Within normal limits. - Gallbladder and bile ducts: Within normal limits. - Spleen: Within normal limits. - Urinary tract: Within normal limits. - Adrenals: Within normal limits. - Pancreas: Within normal limits. - Gastrointestinal tract: There is an approximate 15 cm long segment of small  bowel wall thickening in the pelvis, in the region of the distal ileum, with  mesenteric hyperemia and trace free pelvic fluid. Findings are consistent with  acute enteritis. The more proximal small bowel loops are mildly distended,  suggesting a component of underlying obstruction as well. The terminal ileum has  a normal appearance. The colon is unremarkable. The appendix is not definitely  identified. No focal abscess or free air is identified.  - Retroperitoneum: There is a 3.2 cm fusiform infrarenal abdominal aortic  aneurysm, without evidence of dissection or rupture. - Peritoneal cavity and abdominal wall: Within normal limits. - Pelvis: Within normal limits. - Spine/bones: No acute process. - Other comments: The lung bases are clear. There is a small hiatal hernia. IMPRESSION:   1. Acute enteritis involving an approximately 15 cm long segment of pelvic small  bowel, with an associated mild small bowel obstruction.  There is trace free  fluid in the pelvis as well.  2. 3.2 cm infrarenal abdominal aortic aneurysm. 3. Small hiatal hernia          Past Medical History:   Diagnosis Date    Arteritis, unspecified ([facility] [address]     Asthma     Body mass index between 19-24, adult     Bronchiolitis     CA in situ skin     [facility], [facility] 5/26/2015    Coccyx disorder     Colon polyps     Crushing injury     Dermatitis     dermatitis/urticaria    ED (erectile dysfunction)     Esophageal reflux 5/26/2015    Herpes zoster     Impotence of organic origin     Lumbar back pain     Pain in limb     Reflux esophagitis     Skin disorder     Tobacco use disorder 5/26/2015     Past Surgical History:   Procedure Laterality Date    HX HEART CATHETERIZATION  1999    HX HEMORRHOIDECTOMY  1999     Current Facility-Administered Medications   Medication Dose Route Frequency    famotidine (PF) (PEPCID) injection 20 mg  20 mg IntraVENous Q12H    [START ON 6/15/2019] enoxaparin (LOVENOX) injection 40 mg  40 mg SubCUTAneous Q24H    acetaminophen (TYLENOL) tablet 1,000 mg  1,000 mg Oral Q6H PRN    ondansetron (ZOFRAN) injection 4 mg  4 mg IntraVENous Q4H PRN    dextrose 5% - 0.45% NaCl with KCl 20 mEq/L infusion  100 mL/hr IntraVENous CONTINUOUS    morphine 10 mg/ml injection 2-4 mg  2-4 mg IntraVENous Q1H PRN    phenol throat spray (CHLORASEPTIC) 1 Spray  1 Spray Oral PRN    tuberculin injection 5 Units  5 Units IntraDERMal ONCE    sodium chloride 0.9 % bolus infusion 100 mL  100 mL IntraVENous RAD ONCE    ciprofloxacin (CIPRO) 400 mg in D5W IVPB (premix)  400 mg IntraVENous Q12H    metroNIDAZOLE (FLAGYL) IVPB premix 500 mg  500 mg IntraVENous Q6H     Patient has no known allergies.   Social History     Socioeconomic History    Marital status:      Spouse name: Not on file    Number of children: Not on file    Years of education: Not on file    Highest education level: Not on file   Tobacco Use    Smoking status: Current Every Day Smoker     Packs/day: 1.00     Years: 42.00     Pack years: 42.00    Smokeless tobacco: Former User     Quit date: 1/1/1991   Substance and Sexual Activity    Alcohol use: No    Drug use: No     Social History     Tobacco Use   Smoking Status Current Every Day Smoker    Packs/day: 1.00    Years: 42.00    Pack years: 42.00   Smokeless Tobacco Former User    Quit date: 1/1/1991     Family History   Problem Relation Age of Onset    Stroke Father         CVA    Heart Disease Father     Cancer Mother         Lung     ROS: The patient has no difficulty with chest pain or shortness of breath. No fever or chills. Comprehensive review of systems was otherwise unremarkable except as noted above. Physical Exam:   Visit Vitals  /80   Pulse 60   Temp 98.4 °F (36.9 °C)   Resp 15   Ht 6' 1\" (1.854 m)   Wt 177 lb (80.3 kg)   SpO2 93%   BMI 23.35 kg/m²     Vitals:    06/13/19 2013 06/13/19 2259 06/14/19 0043 06/14/19 0132   BP: 162/85 147/71 149/74 134/80   Pulse: 61 70 63 60   Resp: 18 16 16 15   Temp: 97.6 °F (36.4 °C)  98.4 °F (36.9 °C) 98.4 °F (36.9 °C)   SpO2: 97% 97% 94% 93%   Weight: 177 lb (80.3 kg)      Height: 6' 1\" (1.854 m)        06/13 1901 - 06/14 0700  In: -   Out: 600 [Urine:600]  No intake/output data recorded. Constitutional: Alert, oriented, cooperative patient in no acute distress; appears stated age    Eyes:Sclera are clear. EOMs intact  ENMT: no external lesions gross hearing normal; no obvious neck masses, no ear or lip lesions, nares normal; +NGT  CV: RRR. Normal perfusion  Resp: No JVD. Breathing is  non-labored; no audible wheezing. GI: softer, no palp mass; less tender; no peritoneal signs     Musculoskeletal: unremarkable with normal function. No embolic signs or cyanosis.    Neuro:  Oriented; moves all 4; no focal deficits  Psychiatric: normal affect and mood, no memory impairment    Recent vitals (if inpt):  Patient Vitals for the past 24 hrs:   BP Temp Pulse Resp SpO2 Height Weight   06/14/19 0132 134/80 98.4 °F (36.9 °C) 60 15 93 %     06/14/19 0043 149/74 98.4 °F (36.9 °C) 63 16 94 %     06/13/19 2259 147/71  70 16 97 %     06/13/19 2013 162/85 97.6 °F (36.4 °C) 61 18 97 % 6' 1\" (1.854 m) 177 lb (80.3 kg)       Labs:  Recent Labs     06/13/19 2016   WBC 12.7*   HGB 14.7         K 4.1      CO2 30   BUN 20   CREA 1.08   GLU 96   TBILI 0.4   SGOT 13*   ALT 23   AP 85       Lab Results   Component Value Date/Time    WBC 12.7 (H) 06/13/2019 08:16 PM    HGB 14.7 06/13/2019 08:16 PM    PLATELET 823 10/43/1137 08:16 PM    Sodium 141 06/13/2019 08:16 PM    Potassium 4.1 06/13/2019 08:16 PM    Chloride 106 06/13/2019 08:16 PM    CO2 30 06/13/2019 08:16 PM    BUN 20 06/13/2019 08:16 PM    Creatinine 1.08 06/13/2019 08:16 PM    Glucose 96 06/13/2019 08:16 PM    Bilirubin, total 0.4 06/13/2019 08:16 PM    AST (SGOT) 13 (L) 06/13/2019 08:16 PM    ALT (SGPT) 23 06/13/2019 08:16 PM    Alk. phosphatase 85 06/13/2019 08:16 PM       CT Results  (Last 48 hours)               06/13/19 2235  CT ABD PELV W CONT Final result    Impression:  IMPRESSION:    1. Acute enteritis involving an approximately 15 cm long segment of pelvic small   bowel, with an associated mild small bowel obstruction. There is trace free   fluid in the pelvis as well.   2. 3.2 cm infrarenal abdominal aortic aneurysm. 3. Small hiatal hernia. Narrative:  EXAM: CT abdomen and pelvis with IV contrast.       INDICATION: Abdominal pain. COMPARISON: None. TECHNIQUE: Axial CT images of the abdomen and pelvis were obtained after the   intravenous injection of 100 mL Isovue 370 CT contrast. Oral contrast was   administered as well. Radiation dose reduction techniques were used for this   study. Our CT scanners use one or all of the following:  Automated exposure   control, adjustment of the mA or kV according to patient size, iterative   reconstruction.        FINDINGS:       - Liver: Within normal limits. - Gallbladder and bile ducts: Within normal limits. - Spleen: Within normal limits. - Urinary tract: Within normal limits. - Adrenals: Within normal limits. - Pancreas: Within normal limits. - Gastrointestinal tract: There is an approximate 15 cm long segment of small   bowel wall thickening in the pelvis, in the region of the distal ileum, with   mesenteric hyperemia and trace free pelvic fluid. Findings are consistent with   acute enteritis. The more proximal small bowel loops are mildly distended,   suggesting a component of underlying obstruction as well. The terminal ileum has   a normal appearance. The colon is unremarkable. The appendix is not definitely   identified. No focal abscess or free air is identified.   - Retroperitoneum: There is a 3.2 cm fusiform infrarenal abdominal aortic   aneurysm, without evidence of dissection or rupture. - Peritoneal cavity and abdominal wall: Within normal limits. - Pelvis: Within normal limits. - Spine/bones: No acute process. - Other comments: The lung bases are clear. There is a small hiatal hernia. chest X-ray      I reviewed recent labs, recent radiologic studies, and pertinent records including other doctor notes if needed. I independently reviewed radiology images for studies I described above or studies I have ordered.    Admission date (for inpatients): 6/13/2019   * No surgery found *  * No surgery found *    ASSESSMENT/PLAN:  Problem List  Date Reviewed: 6/13/2019          Codes Class Noted    3.2cm AAA on CT 6/13/19 ICD-10-CM: I71.4  ICD-9-CM: 441.4  6/14/2019        Hiatal hernia ICD-10-CM: K44.9  ICD-9-CM: 553.3  6/14/2019        Generalized abdominal pain ICD-10-CM: R10.84  ICD-9-CM: 789.07  6/13/2019        * (Principal) Small Bowel Enteritis on CT ICD-10-CM: K52.9  ICD-9-CM: 558.9  6/13/2019        SBO ([facility] obstruction) ([facility] ICD-10-CM: W94.762  ICD-9-CM: 560.9  6/13/2019 Leukocytosis ICD-10-CM: D72.829  ICD-9-CM: 288.60  6/13/2019        Tobacco use disorder ICD-10-CM: F17.200  ICD-9-CM: 305.1  5/26/2015        Esophageal reflux ICD-10-CM: K21.9  ICD-9-CM: 530.81  5/26/2015        Chronic airway obstruction, not elsewhere classified ICD-10-CM: J44.9  ICD-9-CM: 548  5/26/2015            Principal Problem:    Small Bowel Enteritis on CT (6/13/2019)    Active Problems:    Tobacco use disorder (5/26/2015)      Generalized abdominal pain (6/13/2019)      SBO (small bowel obstruction) ([address] (6/13/2019)      Leukocytosis (6/13/2019)      3.2cm AAA on CT 6/13/19 (6/14/2019)      Hiatal hernia (6/14/2019)         Small Bowel Enteritis with secondary SBO  DDx Ischemic, Infectious, Inflammatory  NPO/NGT/IVF/Bowel rest  IV Flagyl/Cipro for now  Stool cultures if any diarrhea  GI consult in am for enteritis - possibility of Crohn's and will likely need outpt follow-up  Consider steroids - I would think hold off for now but will defer to GI    Abd pain  Prn pain meds    Leukocytosis  Follow    3.2cm AAA on CT 6/13/19  Refer to vascular surgery as outpt for follow-up    Hiatal hernia on CT 6/13/19  Asymptomatic, no surgery planned      I have personally performed a face-to-face diagnostic evaluation and management  service on this patient. I have independently seen the patient. I have independently obtained the above history from the patient/family. I have independently examined the patient with above findings. I have independently reviewed data/labs for this patient and developed the above plan of care (MDM). Signed: Israel De Santiago.  Pilo Collier MD, FACS

## 2019-06-15 LAB
ANION GAP SERPL CALC-SCNC: 7 MMOL/L (ref 7–16)
BUN SERPL-MCNC: 11 MG/DL (ref 8–23)
CALCIUM SERPL-MCNC: 8.3 MG/DL (ref 8.3–10.4)
CHLORIDE SERPL-SCNC: 107 MMOL/L (ref 98–107)
CO2 SERPL-SCNC: 27 MMOL/L (ref 21–32)
CREAT SERPL-MCNC: 0.95 MG/DL (ref 0.8–1.5)
ERYTHROCYTE [DISTWIDTH] IN BLOOD BY AUTOMATED COUNT: 13.3 % (ref 11.9–14.6)
GLUCOSE SERPL-MCNC: 133 MG/DL (ref 65–100)
HCT VFR BLD AUTO: 46.5 % (ref 41.1–50.3)
HGB BLD-MCNC: 14.7 G/DL (ref 13.6–17.2)
MCH RBC QN AUTO: 30.1 PG (ref 26.1–32.9)
MCHC RBC AUTO-ENTMCNC: 31.6 G/DL (ref 31.4–35)
MCV RBC AUTO: 95.3 FL (ref 79.6–97.8)
MM INDURATION POC: 0 MM (ref 0–5)
NRBC # BLD: 0 K/UL (ref 0–0.2)
PLATELET # BLD AUTO: 247 K/UL (ref 150–450)
PMV BLD AUTO: 10.7 FL (ref 9.4–12.3)
POTASSIUM SERPL-SCNC: 3.9 MMOL/L (ref 3.5–5.1)
PPD POC: NEGATIVE NEGATIVE
RBC # BLD AUTO: 4.88 M/UL (ref 4.23–5.6)
SODIUM SERPL-SCNC: 141 MMOL/L (ref 136–145)
WBC # BLD AUTO: 13.5 K/UL (ref 4.3–11.1)

## 2019-06-15 PROCEDURE — 85027 COMPLETE CBC AUTOMATED: CPT

## 2019-06-15 PROCEDURE — 65270000029 HC RM PRIVATE

## 2019-06-15 PROCEDURE — 80048 BASIC METABOLIC PNL TOTAL CA: CPT

## 2019-06-15 PROCEDURE — 74011250636 HC RX REV CODE- 250/636: Performed by: SURGERY

## 2019-06-15 PROCEDURE — 36415 COLL VENOUS BLD VENIPUNCTURE: CPT

## 2019-06-15 RX ADMIN — METRONIDAZOLE 500 MG: 500 INJECTION, SOLUTION INTRAVENOUS at 05:59

## 2019-06-15 RX ADMIN — MORPHINE SULFATE 2 MG: 10 INJECTION, SOLUTION INTRAMUSCULAR; INTRAVENOUS at 18:48

## 2019-06-15 RX ADMIN — METRONIDAZOLE 500 MG: 500 INJECTION, SOLUTION INTRAVENOUS at 12:51

## 2019-06-15 RX ADMIN — METRONIDAZOLE 500 MG: 500 INJECTION, SOLUTION INTRAVENOUS at 17:23

## 2019-06-15 RX ADMIN — MORPHINE SULFATE 2 MG: 10 INJECTION, SOLUTION INTRAMUSCULAR; INTRAVENOUS at 10:00

## 2019-06-15 RX ADMIN — MORPHINE SULFATE 4 MG: 10 INJECTION, SOLUTION INTRAMUSCULAR; INTRAVENOUS at 01:40

## 2019-06-15 RX ADMIN — DEXTROSE MONOHYDRATE, SODIUM CHLORIDE, AND POTASSIUM CHLORIDE 100 ML/HR: 50; 4.5; 1.49 INJECTION, SOLUTION INTRAVENOUS at 08:40

## 2019-06-15 RX ADMIN — METRONIDAZOLE 500 MG: 500 INJECTION, SOLUTION INTRAVENOUS at 00:33

## 2019-06-15 RX ADMIN — CIPROFLOXACIN 400 MG: 2 INJECTION, SOLUTION INTRAVENOUS at 01:35

## 2019-06-15 RX ADMIN — CIPROFLOXACIN 400 MG: 2 INJECTION, SOLUTION INTRAVENOUS at 14:43

## 2019-06-15 RX ADMIN — FAMOTIDINE 20 MG: 10 INJECTION, SOLUTION INTRAVENOUS at 01:35

## 2019-06-15 RX ADMIN — ENOXAPARIN SODIUM 40 MG: 40 INJECTION SUBCUTANEOUS at 08:33

## 2019-06-15 RX ADMIN — FAMOTIDINE 20 MG: 10 INJECTION, SOLUTION INTRAVENOUS at 14:43

## 2019-06-15 NOTE — PROGRESS NOTES
END OF SHIFT NOTE:    INTAKE/OUTPUT  06/14 0701 - 06/15 0700  In: 0703 [I.V.:1338]  Out: 3250 [Urine:2850]  Voiding: YES  Catheter: NO  Drain:   Nasogastric Tube 06/13/19 (Active)   Site Assessment Clean, dry, & intact 6/15/2019  1:46 PM   Securement Device Tape 6/15/2019  1:46 PM   G Port Status Intermittent Suction 6/15/2019  1:46 PM   External Insertion Simone (cms) 60 cms 6/14/2019  7:50 PM   Action Taken Retaped 6/14/2019 11:03 AM   Drainage Description Dulce Maria Hurst 6/15/2019  5:25 PM   Drainage Chamber Level (ml) 300 ml 6/15/2019  5:25 PM   Output (ml) 300 ml 6/15/2019  5:25 PM               Flatus: Patient does have flatus present. Stool:  0 occurrences. Characteristics:       Emesis: 0 occurrences. Characteristics:        VITAL SIGNS  Patient Vitals for the past 12 hrs:   Temp Pulse Resp BP SpO2   06/15/19 1556 98.3 °F (36.8 °C) 61 18 145/79 95 %   06/15/19 1307 98.1 °F (36.7 °C) 60 18 142/90 94 %   06/15/19 0741 99 °F (37.2 °C) 60 17 145/76 93 %       Pain Assessment  Pain Intensity 1: 0 (06/15/19 1344)  Pain Location 1: Abdomen  Pain Intervention(s) 1: Medication (see MAR)  Patient Stated Pain Goal: 0    Ambulating  No    Shift report given to oncoming nurse at the bedside.     Taz Leung

## 2019-06-15 NOTE — PROGRESS NOTES
[facility] Progress Note      Admit Date: 6/13/2019    CC: SBO    Subjective:     Patient Feeling better. Decreased abdominal pain. Small amount of flatus yesterday. No nausea or vomiting. No diarrhea. Medications:  Current Facility-Administered Medications   Medication Dose Route Frequency    famotidine (PF) (PEPCID) injection 20 mg  20 mg IntraVENous Q12H    enoxaparin (LOVENOX) injection 40 mg  40 mg SubCUTAneous Q24H    acetaminophen (TYLENOL) tablet 1,000 mg  1,000 mg Oral Q6H PRN    ondansetron (ZOFRAN) injection 4 mg  4 mg IntraVENous Q4H PRN    dextrose 5% - 0.45% NaCl with KCl 20 mEq/L infusion  100 mL/hr IntraVENous CONTINUOUS    morphine 10 mg/ml injection 2-4 mg  2-4 mg IntraVENous Q1H PRN    phenol throat spray (CHLORASEPTIC) 1 Spray  1 Spray Oral PRN    ciprofloxacin (CIPRO) 400 mg in D5W IVPB (premix)  400 mg IntraVENous Q12H    metroNIDAZOLE (FLAGYL) IVPB premix 500 mg  500 mg IntraVENous Q6H       ROS: Otherwise negative in 10 systems except as noted above in Subjective. Objective:   Vitals:  Visit Vitals  /76   Pulse 60   Temp 99 °F (37.2 °C)   Resp 17   Ht 6' 1\" (1.854 m)   Wt 80.3 kg (177 lb)   SpO2 93%   BMI 23.35 kg/m²       Intake/Output:  06/15 0701 - 06/15 1900  In: 780 [I.V.:780]  Out: -   06/13 1901 - 06/15 0700  In: 3224 [I.V.:1338]  Out: 3850 [Urine:3450]    Exam:  General appearance: alert, no distress  Lungs: clear to auscultation bilaterally  Heart: regular rate and rhythm  Abdomen: soft, non-distended, Mild tenderness bilateral lower quadrants .  Bowel sounds normal. No masses,  no organomegaly  Extremities: extremities normal, no cyanosis or edema    Data Review (Labs):    Recent Results (from the past 24 hour(s))   CBC W/O DIFF    Collection Time: 06/15/19  2:30 AM   Result Value Ref Range    WBC 13.5 (H) 4.3 - 11.1 K/uL    RBC 4.88 4.23 - 5.6 M/uL    HGB 14.7 13.6 - 17.2 g/dL    HCT 46.5 41.1 - 50.3 %    MCV 95.3 79.6 - 97.8 FL    MCH 30.1 26.1 - 32.9 PG    MCHC 31.6 31.4 - 35.0 g/dL    RDW 13.3 11.9 - 14.6 %    PLATELET 054 740 - 555 K/uL    MPV 10.7 9.4 - 12.3 FL    ABSOLUTE NRBC 0.00 0.0 - 0.2 K/uL   METABOLIC PANEL, BASIC    Collection Time: 06/15/19  2:30 AM   Result Value Ref Range    Sodium 141 136 - 145 mmol/L    Potassium 3.9 3.5 - 5.1 mmol/L    Chloride 107 98 - 107 mmol/L    CO2 27 21 - 32 mmol/L    Anion gap 7 7 - 16 mmol/L    Glucose 133 (H) 65 - 100 mg/dL    BUN 11 8 - 23 MG/DL    Creatinine 0.95 0.8 - 1.5 MG/DL    GFR est AA >60 >60 ml/min/1.73m2    GFR est non-AA >60 >60 ml/min/1.73m2    Calcium 8.3 8.3 - 10.4 MG/DL       Assessment:     Principal Problem:    Small Bowel Enteritis on CT (6/13/2019)    Active Problems:    Tobacco use disorder (5/26/2015)      Generalized abdominal pain (6/13/2019)      SBO (small bowel obstruction) (HCC) (6/13/2019)      Leukocytosis (6/13/2019)      3.2cm AAA on CT 6/13/19 (6/14/2019)      Hiatal hernia (6/14/2019)        Plan:     1. Distal small bowel obstruction, currently improved   2. Continue bowel rest, IV fluid, NG tube decompression   3. Small amount of flatus yesterday but none today. Not ready for p.o. intake yet   4.  Leukocytosis improved, hemoglobin remains normal.   5. He will likely require outpatient colonoscopy after current episode resolves     Sita Corona MD  [facility]

## 2019-06-15 NOTE — PROGRESS NOTES
Problem: Falls - Risk of  Goal: *Absence of Falls  Description  Document Zenaida Latin Fall Risk and appropriate interventions in the flowsheet.   Outcome: Progressing Towards Goal

## 2019-06-15 NOTE — PROGRESS NOTES
H&P/Consult Note/Progress Note/Office Note:   Claudette Smith  MRN: [id number]  :1955  Age:63 y.o.    HPI: Claudette Smith is a 58 y.o. male who was admitted from the ER on 19 after presenting with   a 1 day h/o progressive, constant, severe, non-radiating, generalized abd pain. Nothing made pain better or worse. No associated N/V/F/C or diarrhea  Normally has 1-2 solid BMs qday but lately BM only every other day  o h/o bloody diarrhea or sign diarrhea issues    WBC 12.7k  Surgery consulted after CT showed acute enteritis with component of underlying obstrauction  No h/o Crohn's or lymphoma  He is s/p appy in 1990s        19 CT abd/pelvis with PO and IV contrast  - Liver: Within normal limits. - Gallbladder and bile ducts: Within normal limits. - Spleen: Within normal limits. - Urinary tract: Within normal limits. - Adrenals: Within normal limits. - Pancreas: Within normal limits. - Gastrointestinal tract: There is an approximate 15 cm long segment of small  bowel wall thickening in the pelvis, in the region of the distal ileum, with  mesenteric hyperemia and trace free pelvic fluid. Findings are consistent with  acute enteritis. The more proximal small bowel loops are mildly distended,  suggesting a component of underlying obstruction as well. The terminal ileum has  a normal appearance. The colon is unremarkable. The appendix is not definitely  identified. No focal abscess or free air is identified.  - Retroperitoneum: There is a 3.2 cm fusiform infrarenal abdominal aortic  aneurysm, without evidence of dissection or rupture. - Peritoneal cavity and abdominal wall: Within normal limits. - Pelvis: Within normal limits. - Spine/bones: No acute process. - Other comments: The lung bases are clear. There is a small hiatal hernia. IMPRESSION:   1. Acute enteritis involving an approximately 15 cm long segment of pelvic small  bowel, with an associated mild small bowel obstruction. There is trace free  fluid in the pelvis as well.  2. 3.2 cm infrarenal abdominal aortic aneurysm. 3. Small hiatal hernia          Past Medical History:   Diagnosis Date    Arteritis, unspecified ([facility] [address]     Asthma     Body mass index between 19-24, adult     Bronchiolitis     CA in situ skin     Chronic airway obstruction, not elsewhere classified ([email or web address] 5/26/2015    Coccyx disorder     Colon polyps     Crushing injury     Dermatitis     dermatitis/urticaria    ED (erectile dysfunction)     Esophageal reflux 5/26/2015    Herpes zoster     Impotence of organic origin     Lumbar back pain     Pain in limb     Reflux esophagitis     Skin disorder     Tobacco use disorder 5/26/2015     Past Surgical History:   Procedure Laterality Date    HX HEART CATHETERIZATION  1999    HX HEMORRHOIDECTOMY  1999     Current Facility-Administered Medications   Medication Dose Route Frequency    famotidine (PF) (PEPCID) injection 20 mg  20 mg IntraVENous Q12H    enoxaparin (LOVENOX) injection 40 mg  40 mg SubCUTAneous Q24H    acetaminophen (TYLENOL) tablet 1,000 mg  1,000 mg Oral Q6H PRN    ondansetron (ZOFRAN) injection 4 mg  4 mg IntraVENous Q4H PRN    dextrose 5% - 0.45% NaCl with KCl 20 mEq/L infusion  100 mL/hr IntraVENous CONTINUOUS    morphine 10 mg/ml injection 2-4 mg  2-4 mg IntraVENous Q1H PRN    phenol throat spray (CHLORASEPTIC) 1 Spray  1 Spray Oral PRN    ciprofloxacin (CIPRO) 400 mg in D5W IVPB (premix)  400 mg IntraVENous Q12H    metroNIDAZOLE (FLAGYL) IVPB premix 500 mg  500 mg IntraVENous Q6H     Patient has no known allergies.   Social History     Socioeconomic History    Marital status:      Spouse name: Not on file    Number of children: Not on file    Years of education: Not on file    Highest education level: Not on file   Tobacco Use    Smoking status: Current Every Day Smoker     Packs/day: 1.00     Years: 42.00     Pack years: 42.00    Smokeless tobacco: Former User Quit date: 1/1/1991   Substance and Sexual Activity    Alcohol use: No    Drug use: No     Social History     Tobacco Use   Smoking Status Current Every Day Smoker    Packs/day: 1.00    Years: 42.00    Pack years: 42.00   Smokeless Tobacco Former User    Quit date: 1/1/1991     Family History   Problem Relation Age of Onset    Stroke Father         CVA    Heart Disease Father     Cancer Mother         Lung     ROS: The patient has no difficulty with chest pain or shortness of breath. No fever or chills. Comprehensive review of systems was otherwise unremarkable except as noted above. Physical Exam:   Visit Vitals  /74   Pulse 71   Temp 98.9 °F (37.2 °C)   Resp 16   Ht 6' 1\" (1.854 m)   Wt 177 lb (80.3 kg)   SpO2 98%   BMI 23.35 kg/m²     Vitals:    06/14/19 1455 06/14/19 1939 06/14/19 2337 06/15/19 0401   BP: 149/81 141/79 141/78 155/74   Pulse: 60 68 61 71   Resp: 17 16 17 16   Temp: 99 °F (37.2 °C) 98.9 °F (37.2 °C) 98.8 °F (37.1 °C) 98.9 °F (37.2 °C)   SpO2: 93% 98% 91% 98%   Weight:       Height:         No intake/output data recorded. 06/13 1901 - 06/15 0700  In: 2190 [I.V.:1338]  Out: 3400 [Urine:3000]    Constitutional: Alert, oriented, cooperative patient in no acute distress; appears stated age    Eyes:Sclera are clear. EOMs intact  ENMT: no external lesions gross hearing normal; no obvious neck masses, no ear or lip lesions, nares normal; +NGT  CV: RRR. Normal perfusion  Resp: No JVD. Breathing is  non-labored; no audible wheezing. GI: softer, no palp mass; less tender; no peritoneal signs     Musculoskeletal: unremarkable with normal function. No embolic signs or cyanosis.    Neuro:  Oriented; moves all 4; no focal deficits  Psychiatric: normal affect and mood, no memory impairment    Recent vitals (if inpt):  Patient Vitals for the past 24 hrs:   BP Temp Pulse Resp SpO2   06/15/19 0401 155/74 98.9 °F (37.2 °C) 71 16 98 %   06/14/19 2337 141/78 98.8 °F (37.1 °C) 61 17 91 % 06/14/19 1939 141/79 98.9 °F (37.2 °C) 68 16 98 %   06/14/19 1455 149/81 99 °F (37.2 °C) 60 17 93 %   06/14/19 1203 153/79 98.6 °F (37 °C) 80 16 98 %   06/14/19 0730 159/80 99.4 °F (37.4 °C) 70 16 92 %       Labs:  Recent Labs     06/15/19  0230  06/13/19 2016   WBC 13.5*   < > 12.7*   HGB 14.7   < > 14.7      < > 283      < > 141   K 3.9   < > 4.1      < > 106   CO2 27   < > 30   BUN 11   < > 20   CREA 0.95   < > 1.08   *   < > 96   TBILI  --   --  0.4   SGOT  --   --  13*   ALT  --   --  23   AP  --   --  85    < > = values in this interval not displayed. Lab Results   Component Value Date/Time    WBC 13.5 (H) 06/15/2019 02:30 AM    HGB 14.7 06/15/2019 02:30 AM    PLATELET 432 71/41/5294 02:30 AM    Sodium 141 06/15/2019 02:30 AM    Potassium 3.9 06/15/2019 02:30 AM    Chloride 107 06/15/2019 02:30 AM    CO2 27 06/15/2019 02:30 AM    BUN 11 06/15/2019 02:30 AM    Creatinine 0.95 06/15/2019 02:30 AM    Glucose 133 (H) 06/15/2019 02:30 AM    Bilirubin, total 0.4 06/13/2019 08:16 PM    AST (SGOT) 13 (L) 06/13/2019 08:16 PM    ALT (SGPT) 23 06/13/2019 08:16 PM    Alk. phosphatase 85 06/13/2019 08:16 PM       CT Results  (Last 48 hours)               06/13/19 2235  CT ABD PELV W CONT Final result    Impression:  IMPRESSION:    1. Acute enteritis involving an approximately 15 cm long segment of pelvic small   bowel, with an associated mild small bowel obstruction. There is trace free   fluid in the pelvis as well.   2. 3.2 cm infrarenal abdominal aortic aneurysm. 3. Small hiatal hernia. Narrative:  EXAM: CT abdomen and pelvis with IV contrast.       INDICATION: Abdominal pain. COMPARISON: None. TECHNIQUE: Axial CT images of the abdomen and pelvis were obtained after the   intravenous injection of 100 mL Isovue 370 CT contrast. Oral contrast was   administered as well. Radiation dose reduction techniques were used for this   study.   Our CT scanners use one or all of the following:  Automated exposure   control, adjustment of the mA or kV according to patient size, iterative   reconstruction. FINDINGS:       - Liver: Within normal limits. - Gallbladder and bile ducts: Within normal limits. - Spleen: Within normal limits. - Urinary tract: Within normal limits. - Adrenals: Within normal limits. - Pancreas: Within normal limits. - Gastrointestinal tract: There is an approximate 15 cm long segment of small   bowel wall thickening in the pelvis, in the region of the distal ileum, with   mesenteric hyperemia and trace free pelvic fluid. Findings are consistent with   acute enteritis. The more proximal small bowel loops are mildly distended,   suggesting a component of underlying obstruction as well. The terminal ileum has   a normal appearance. The colon is unremarkable. The appendix is not definitely   identified. No focal abscess or free air is identified.   - Retroperitoneum: There is a 3.2 cm fusiform infrarenal abdominal aortic   aneurysm, without evidence of dissection or rupture. - Peritoneal cavity and abdominal wall: Within normal limits. - Pelvis: Within normal limits. - Spine/bones: No acute process. - Other comments: The lung bases are clear. There is a small hiatal hernia. chest X-ray      I reviewed recent labs, recent radiologic studies, and pertinent records including other doctor notes if needed. I independently reviewed radiology images for studies I described above or studies I have ordered.    Admission date (for inpatients): 6/13/2019   * No surgery found *  * No surgery found *    ASSESSMENT/PLAN:  Problem List  Date Reviewed: 6/13/2019          Codes Class Noted    3.2cm AAA on CT 6/13/19 ICD-10-CM: I71.4  ICD-9-CM: 441.4  6/14/2019        Hiatal hernia ICD-10-CM: K44.9  ICD-9-CM: 553.3  6/14/2019        Generalized abdominal pain ICD-10-CM: R10.84  ICD-9-CM: 789.07  6/13/2019        * (Principal) Small Bowel Enteritis on CT ICD-10-CM: K52.9  ICD-9-CM: 558.9  6/13/2019        SBO (small bowel obstruction) ([facility] [address] ICD-10-CM: E27.455  ICD-9-CM: 560.9  6/13/2019        Leukocytosis ICD-10-CM: Y15.021  ICD-9-CM: 288.60  6/13/2019        Tobacco use disorder ICD-10-CM: F17.200  ICD-9-CM: 305.1  5/26/2015        Esophageal reflux ICD-10-CM: K21.9  ICD-9-CM: 530.81  5/26/2015        Chronic airway obstruction, not elsewhere classified ICD-10-CM: J44.9  ICD-9-CM: 123  5/26/2015            Principal Problem:    Small Bowel Enteritis on CT (6/13/2019)    Active Problems:    Tobacco use disorder (5/26/2015)      Generalized abdominal pain (6/13/2019)      SBO (small bowel obstruction) ([facility] [address] (6/13/2019)      Leukocytosis (6/13/2019)      3.2cm AAA on CT 6/13/19 (6/14/2019)      Hiatal hernia (6/14/2019)         Small Bowel Enteritis with secondary SBO  DDx Infectious, Inflammatory, Ischemic   NPO/NGT/IVF/Bowel rest  IV Flagyl/Cipro for now  Stool cultures if any diarrhea  GI will likely perform colonoscopy as outpt after acute illness resolves and follow for possible Crohn's      Abd pain  Prn pain meds    Leukocytosis  Improving  Follow    3.2cm AAA on CT 6/13/19  Refer to vascular surgery as outpt for follow-up    Hiatal hernia on CT 6/13/19  Asymptomatic, no surgery planned        I have personally performed a face-to-face diagnostic evaluation and management  service on this patient. I have independently seen the patient. I have independently obtained the above history from the patient/family. I have independently examined the patient with above findings. I have independently reviewed data/labs for this patient and developed the above plan of care (MDM). Signed: Marixa Ch.  Kenia Quick MD, FACS

## 2019-06-16 LAB
ANION GAP SERPL CALC-SCNC: 7 MMOL/L (ref 7–16)
BUN SERPL-MCNC: 11 MG/DL (ref 8–23)
CALCIUM SERPL-MCNC: 8.5 MG/DL (ref 8.3–10.4)
CHLORIDE SERPL-SCNC: 104 MMOL/L (ref 98–107)
CO2 SERPL-SCNC: 28 MMOL/L (ref 21–32)
CREAT SERPL-MCNC: 0.92 MG/DL (ref 0.8–1.5)
ERYTHROCYTE [DISTWIDTH] IN BLOOD BY AUTOMATED COUNT: 12.9 % (ref 11.9–14.6)
GLUCOSE SERPL-MCNC: 114 MG/DL (ref 65–100)
HCT VFR BLD AUTO: 46.9 % (ref 41.1–50.3)
HGB BLD-MCNC: 14.6 G/DL (ref 13.6–17.2)
MCH RBC QN AUTO: 29.7 PG (ref 26.1–32.9)
MCHC RBC AUTO-ENTMCNC: 31.1 G/DL (ref 31.4–35)
MCV RBC AUTO: 95.5 FL (ref 79.6–97.8)
MM INDURATION POC: 0 MM (ref 0–5)
NRBC # BLD: 0 K/UL (ref 0–0.2)
PLATELET # BLD AUTO: 250 K/UL (ref 150–450)
PMV BLD AUTO: 10.7 FL (ref 9.4–12.3)
POTASSIUM SERPL-SCNC: 4.2 MMOL/L (ref 3.5–5.1)
PPD POC: NEGATIVE NEGATIVE
RBC # BLD AUTO: 4.91 M/UL (ref 4.23–5.6)
SODIUM SERPL-SCNC: 139 MMOL/L (ref 136–145)
WBC # BLD AUTO: 12.6 K/UL (ref 4.3–11.1)

## 2019-06-16 PROCEDURE — 65270000029 HC RM PRIVATE

## 2019-06-16 PROCEDURE — 80048 BASIC METABOLIC PNL TOTAL CA: CPT

## 2019-06-16 PROCEDURE — 85027 COMPLETE CBC AUTOMATED: CPT

## 2019-06-16 PROCEDURE — 36415 COLL VENOUS BLD VENIPUNCTURE: CPT

## 2019-06-16 PROCEDURE — 74011250636 HC RX REV CODE- 250/636: Performed by: SURGERY

## 2019-06-16 RX ORDER — TAMSULOSIN HYDROCHLORIDE 0.4 MG/1
0.4 CAPSULE ORAL DAILY
COMMUNITY

## 2019-06-16 RX ADMIN — METRONIDAZOLE 500 MG: 500 INJECTION, SOLUTION INTRAVENOUS at 06:14

## 2019-06-16 RX ADMIN — FAMOTIDINE 20 MG: 10 INJECTION, SOLUTION INTRAVENOUS at 02:45

## 2019-06-16 RX ADMIN — MORPHINE SULFATE 4 MG: 10 INJECTION, SOLUTION INTRAMUSCULAR; INTRAVENOUS at 20:18

## 2019-06-16 RX ADMIN — MORPHINE SULFATE 2 MG: 10 INJECTION, SOLUTION INTRAMUSCULAR; INTRAVENOUS at 07:39

## 2019-06-16 RX ADMIN — DEXTROSE MONOHYDRATE, SODIUM CHLORIDE, AND POTASSIUM CHLORIDE 100 ML/HR: 50; 4.5; 1.49 INJECTION, SOLUTION INTRAVENOUS at 00:24

## 2019-06-16 RX ADMIN — DEXTROSE MONOHYDRATE, SODIUM CHLORIDE, AND POTASSIUM CHLORIDE 100 ML/HR: 50; 4.5; 1.49 INJECTION, SOLUTION INTRAVENOUS at 14:16

## 2019-06-16 RX ADMIN — FAMOTIDINE 20 MG: 10 INJECTION, SOLUTION INTRAVENOUS at 14:09

## 2019-06-16 RX ADMIN — ENOXAPARIN SODIUM 40 MG: 40 INJECTION SUBCUTANEOUS at 10:29

## 2019-06-16 RX ADMIN — METRONIDAZOLE 500 MG: 500 INJECTION, SOLUTION INTRAVENOUS at 00:21

## 2019-06-16 RX ADMIN — CIPROFLOXACIN 400 MG: 2 INJECTION, SOLUTION INTRAVENOUS at 02:45

## 2019-06-16 RX ADMIN — METRONIDAZOLE 500 MG: 500 INJECTION, SOLUTION INTRAVENOUS at 14:08

## 2019-06-16 RX ADMIN — METRONIDAZOLE 500 MG: 500 INJECTION, SOLUTION INTRAVENOUS at 20:19

## 2019-06-16 RX ADMIN — CIPROFLOXACIN 400 MG: 2 INJECTION, SOLUTION INTRAVENOUS at 17:23

## 2019-06-16 NOTE — PROGRESS NOTES
END OF SHIFT NOTE:    INTAKE/OUTPUT  06/15 0701 - 06/16 0700  In: 1917 [I.V.:1917]  Out: 1675 [Urine:425]  Voiding: YES    Catheter: NO  Drain:   Nasogastric Tube 06/13/19 (Active)   Site Assessment Clean, dry, & intact 6/16/2019  5:25 PM   Securement Device Tape 6/16/2019  5:25 PM   G Port Status Intermittent Suction 6/16/2019  5:25 PM   External Insertion Simone (cms) 60 cms 6/14/2019  7:50 PM   Action Taken Retaped 6/14/2019 11:03 AM   Drainage Description Shaun Rene 6/16/2019  5:25 PM   Drainage Chamber Level (ml) 300 ml 6/16/2019  5:25 PM   Output (ml) 750 ml 6/16/2019  8:00 AM               Flatus: Patient does have flatus present. Stool:  0 occurrences. Characteristics:       Emesis: 0 occurrences. Characteristics:        VITAL SIGNS  Patient Vitals for the past 12 hrs:   Temp Pulse Resp BP SpO2   06/16/19 1548 98.6 °F (37 °C) 60 18 160/82 95 %   06/16/19 1201 97.7 °F (36.5 °C) 66 18 150/87 99 %   06/16/19 0746 98 °F (36.7 °C) 60 18 139/73 92 %       Pain Assessment  Pain Intensity 1: 5 (06/16/19 0740)  Pain Location 1: Abdomen  Pain Intervention(s) 1: Medication (see MAR)  Patient Stated Pain Goal: 0    Ambulating  Yes, patient ambulated in the hope with family present, and around his room. Patient did not c/o pain this shift. Shift report given to oncoming nurse at the bedside.     Teresa Swift RN

## 2019-06-16 NOTE — PROGRESS NOTES
H&P/Consult Note/Progress Note/Office Note:   Tima Pritchett  MRN: [id number]  :1955  Age:63 y.o.    HPI: Tima Pritchett is a 58 y.o. male who was admitted from the ER on 19 after presenting with   a 1 day h/o progressive, constant, severe, non-radiating, generalized abd pain. Nothing made pain better or worse. No associated N/V/F/C or diarrhea  Normally has 1-2 solid BMs qday but lately BM only every other day  o h/o bloody diarrhea or sign diarrhea issues    WBC 12.7k  Surgery consulted after CT showed acute enteritis with component of underlying obstrauction  No h/o Crohn's or lymphoma  He is s/p appy in 1990s        19 CT abd/pelvis with PO and IV contrast  - Liver: Within normal limits. - Gallbladder and bile ducts: Within normal limits. - Spleen: Within normal limits. - Urinary tract: Within normal limits. - Adrenals: Within normal limits. - Pancreas: Within normal limits. - Gastrointestinal tract: There is an approximate 15 cm long segment of small  bowel wall thickening in the pelvis, in the region of the distal ileum, with  mesenteric hyperemia and trace free pelvic fluid. Findings are consistent with  acute enteritis. The more proximal small bowel loops are mildly distended,  suggesting a component of underlying obstruction as well. The terminal ileum has  a normal appearance. The colon is unremarkable. The appendix is not definitely  identified. No focal abscess or free air is identified.  - Retroperitoneum: There is a 3.2 cm fusiform infrarenal abdominal aortic  aneurysm, without evidence of dissection or rupture. - Peritoneal cavity and abdominal wall: Within normal limits. - Pelvis: Within normal limits. - Spine/bones: No acute process. - Other comments: The lung bases are clear. There is a small hiatal hernia. IMPRESSION:   1. Acute enteritis involving an approximately 15 cm long segment of pelvic small  bowel, with an associated mild small bowel obstruction. There is trace free  fluid in the pelvis as well.  2. 3.2 cm infrarenal abdominal aortic aneurysm. 3. Small hiatal hernia      Additional hx:  6/15/19 passing flatus; pain better  6/16/19 passing more flatus, no bm yet; some lower abd pain after getting up mira BR earlier        Past Medical History:   Diagnosis Date    Arteritis, unspecified ([facility] [address]     Asthma     Body mass index between 19-24, adult     Bronchiolitis     CA in situ skin     Chronic airway obstruction, not elsewhere classified ([facility] [address] 5/26/2015    Coccyx disorder     Colon polyps     Crushing injury     Dermatitis     dermatitis/urticaria    ED (erectile dysfunction)     Esophageal reflux 5/26/2015    Herpes zoster     Impotence of organic origin     Lumbar back pain     Pain in limb     Reflux esophagitis     Skin disorder     Tobacco use disorder 5/26/2015     Past Surgical History:   Procedure Laterality Date    HX HEART CATHETERIZATION  1999    HX HEMORRHOIDECTOMY  1999     Current Facility-Administered Medications   Medication Dose Route Frequency    famotidine (PF) (PEPCID) injection 20 mg  20 mg IntraVENous Q12H    enoxaparin (LOVENOX) injection 40 mg  40 mg SubCUTAneous Q24H    acetaminophen (TYLENOL) tablet 1,000 mg  1,000 mg Oral Q6H PRN    ondansetron (ZOFRAN) injection 4 mg  4 mg IntraVENous Q4H PRN    dextrose 5% - 0.45% NaCl with KCl 20 mEq/L infusion  100 mL/hr IntraVENous CONTINUOUS    morphine 10 mg/ml injection 2-4 mg  2-4 mg IntraVENous Q1H PRN    phenol throat spray (CHLORASEPTIC) 1 Spray  1 Spray Oral PRN    ciprofloxacin (CIPRO) 400 mg in D5W IVPB (premix)  400 mg IntraVENous Q12H    metroNIDAZOLE (FLAGYL) IVPB premix 500 mg  500 mg IntraVENous Q6H     Patient has no known allergies.   Social History     Socioeconomic History    Marital status:      Spouse name: Not on file    Number of children: Not on file    Years of education: Not on file    Highest education level: Not on file   Tobacco Use    Smoking status: Current Every Day Smoker     Packs/day: 1.00     Years: 42.00     Pack years: 42.00    Smokeless tobacco: Former User     Quit date: 1/1/1991   Substance and Sexual Activity    Alcohol use: No    Drug use: No     Social History     Tobacco Use   Smoking Status Current Every Day Smoker    Packs/day: 1.00    Years: 42.00    Pack years: 42.00   Smokeless Tobacco Former User    Quit date: 1/1/1991     Family History   Problem Relation Age of Onset    Stroke Father         CVA    Heart Disease Father     Cancer Mother         Lung     ROS: The patient has no difficulty with chest pain or shortness of breath. No fever or chills. Comprehensive review of systems was otherwise unremarkable except as noted above. Physical Exam:   Visit Vitals  /73   Pulse 60   Temp 98 °F (36.7 °C)   Resp 18   Ht 6' 1\" (1.854 m)   Wt 177 lb (80.3 kg)   SpO2 92%   BMI 23.35 kg/m²     Vitals:    06/15/19 1952 06/15/19 2324 06/16/19 0329 06/16/19 0746   BP: 145/78 149/76 155/88 139/73   Pulse: 63 77 61 60   Resp: 18 17 18 18   Temp: 98.3 °F (36.8 °C) 98.1 °F (36.7 °C) 99.7 °F (37.6 °C) 98 °F (36.7 °C)   SpO2: 92% 94% 95% 92%   Weight:       Height:         06/16 0701 - 06/16 1900  In: 2702 [I.V.:1156]  Out: 750   06/14 1901 - 06/16 0700  In: 2690 [I.V.:2705]  Out: 3575 [Urine:2225]    Constitutional: Alert, oriented, cooperative patient in no acute distress; appears stated age    Eyes:Sclera are clear. EOMs intact  ENMT: no external lesions gross hearing normal; no obvious neck masses, no ear or lip lesions, nares normal; +NGT  CV: RRR. Normal perfusion  Resp: No JVD. Breathing is  non-labored; no audible wheezing. GI: softer, no palp mass; some lower abd tenderness; no peritoneal signs     Musculoskeletal: unremarkable with normal function. No embolic signs or cyanosis.    Neuro:  Oriented; moves all 4; no focal deficits  Psychiatric: normal affect and mood, no memory impairment    Recent vitals (if inpt):  Patient Vitals for the past 24 hrs:   BP Temp Pulse Resp SpO2   06/16/19 0746 139/73 98 °F (36.7 °C) 60 18 92 %   06/16/19 0329 155/88 99.7 °F (37.6 °C) 61 18 95 %   06/15/19 2324 149/76 98.1 °F (36.7 °C) 77 17 94 %   06/15/19 1952 145/78 98.3 °F (36.8 °C) 63 18 92 %   06/15/19 1556 145/79 98.3 °F (36.8 °C) 61 18 95 %   06/15/19 1307 142/90 98.1 °F (36.7 °C) 60 18 94 %       Labs:  Recent Labs     06/16/19  0410  06/13/19 2016   WBC 12.6*   < > 12.7*   HGB 14.6   < > 14.7      < > 283      < > 141   K 4.2   < > 4.1      < > 106   CO2 28   < > 30   BUN 11   < > 20   CREA 0.92   < > 1.08   *   < > 96   TBILI  --   --  0.4   SGOT  --   --  13*   ALT  --   --  23   AP  --   --  85    < > = values in this interval not displayed. Lab Results   Component Value Date/Time    WBC 12.6 (H) 06/16/2019 04:10 AM    HGB 14.6 06/16/2019 04:10 AM    PLATELET 023 23/09/8337 04:10 AM    Sodium 139 06/16/2019 04:10 AM    Potassium 4.2 06/16/2019 04:10 AM    Chloride 104 06/16/2019 04:10 AM    CO2 28 06/16/2019 04:10 AM    BUN 11 06/16/2019 04:10 AM    Creatinine 0.92 06/16/2019 04:10 AM    Glucose 114 (H) 06/16/2019 04:10 AM    Bilirubin, total 0.4 06/13/2019 08:16 PM    AST (SGOT) 13 (L) 06/13/2019 08:16 PM    ALT (SGPT) 23 06/13/2019 08:16 PM    Alk. phosphatase 85 06/13/2019 08:16 PM       CT Results  (Last 48 hours)    None        chest X-ray      I reviewed recent labs, recent radiologic studies, and pertinent records including other doctor notes if needed. I independently reviewed radiology images for studies I described above or studies I have ordered.    Admission date (for inpatients): 6/13/2019   * No surgery found *  * No surgery found *    ASSESSMENT/PLAN:  Problem List  Date Reviewed: 6/13/2019          Codes Class Noted    3.2cm AAA on CT 6/13/19 ICD-10-CM: I71.4  ICD-9-CM: 441.4  6/14/2019        Hiatal hernia ICD-10-CM: K44.9  ICD-9-CM: 553.3  6/14/2019        Generalized abdominal pain ICD-10-CM: R10.84  ICD-9-CM: 789.07  6/13/2019        * (Principal) Small Bowel Enteritis on CT ICD-10-CM: K52.9  ICD-9-CM: 558.9  6/13/2019        SBO (small bowel obstruction) ([facility] ICD-10-CM: J88.684  ICD-9-CM: 560.9  6/13/2019        Leukocytosis ICD-10-CM: L73.650  ICD-9-CM: 288.60  6/13/2019        Tobacco use disorder ICD-10-CM: F17.200  ICD-9-CM: 305.1  5/26/2015        Esophageal reflux ICD-10-CM: K21.9  ICD-9-CM: 530.81  5/26/2015        Chronic airway obstruction, not elsewhere classified ICD-10-CM: J44.9  ICD-9-CM: 800  5/26/2015            Principal Problem:    Small Bowel Enteritis on CT (6/13/2019)    Active Problems:    Tobacco use disorder (5/26/2015)      Generalized abdominal pain (6/13/2019)      SBO (small bowel obstruction) ([facility] (6/13/2019)      Leukocytosis (6/13/2019)      3.2cm AAA on CT 6/13/19 (6/14/2019)      Hiatal hernia (6/14/2019)         Small Bowel Enteritis with secondary SBO  DDx Infectious, Inflammatory, Ischemic   Passing flatus each day  Some slight worsening of lower abd pain after getting up to BR this am - monitor  Likely repeat CT in next 48hrs - probably Tuesday am or sooner if needed  NPO/NGT/IVF/Bowel rest  IV Flagyl/Cipro   Stool cultures if any diarrhea  GI will likely perform colonoscopy as outpt after acute illness resolves and follow for possible Crohn's      Abd pain  Prn pain meds    Leukocytosis  Improving each day  Wbc 12.6 on 6/16/19  Follow    3.2cm AAA on CT 6/13/19  Refer to vascular surgery as outpt for follow-up    Hiatal hernia on CT 6/13/19  Asymptomatic, no surgery planned        I have personally performed a face-to-face diagnostic evaluation and management  service on this patient. I have independently seen the patient. I have independently obtained the above history from the patient/family. I have independently examined the patient with above findings.   I have independently reviewed data/labs for this patient and developed the above plan of care (MDM). Signed: Nghia Carbajal.  Blanquita Flores MD, FACS

## 2019-06-16 NOTE — PROGRESS NOTES
[facility] Progress Note      Admit Date: 6/13/2019    CC: SBO    Subjective:     Patient Feeling a little better. Decreased abdominal pain. Increased flatus yesterday. No nausea or vomiting. No diarrhea. No BM. Medications:  Current Facility-Administered Medications   Medication Dose Route Frequency    famotidine (PF) (PEPCID) injection 20 mg  20 mg IntraVENous Q12H    enoxaparin (LOVENOX) injection 40 mg  40 mg SubCUTAneous Q24H    acetaminophen (TYLENOL) tablet 1,000 mg  1,000 mg Oral Q6H PRN    ondansetron (ZOFRAN) injection 4 mg  4 mg IntraVENous Q4H PRN    dextrose 5% - 0.45% NaCl with KCl 20 mEq/L infusion  100 mL/hr IntraVENous CONTINUOUS    morphine 10 mg/ml injection 2-4 mg  2-4 mg IntraVENous Q1H PRN    phenol throat spray (CHLORASEPTIC) 1 Spray  1 Spray Oral PRN    ciprofloxacin (CIPRO) 400 mg in D5W IVPB (premix)  400 mg IntraVENous Q12H    metroNIDAZOLE (FLAGYL) IVPB premix 500 mg  500 mg IntraVENous Q6H       ROS: Otherwise negative in 10 systems except as noted above in Subjective. Objective:   Vitals:  Visit Vitals  /73   Pulse 60   Temp 98 °F (36.7 °C)   Resp 18   Ht 6' 1\" (1.854 m)   Wt 80.3 kg (177 lb)   SpO2 92%   BMI 23.35 kg/m²       Intake/Output:  06/16 0701 - 06/16 1900  In: 2379 [I.V.:1156]  Out: 750   06/14 1901 - 06/16 0700  In: 8327 [I.V.:2705]  Out: 1746 [Urine:2225]    Exam:  General appearance: alert, no distress  Lungs: clear to auscultation bilaterally  Heart: regular rate and rhythm  Abdomen: soft, non-distended, Mild tenderness Left lower quadrant. Bowel sounds hypoactive.  No masses, no organomegaly  Extremities: extremities normal, no cyanosis or edema    Data Review (Labs):    Recent Results (from the past 24 hour(s))   PLEASE READ & DOCUMENT PPD TEST IN 48 HRS    Collection Time: 06/16/19  2:46 AM   Result Value Ref Range    PPD Negative Negative    mm Induration 0 0 - 5 mm   CBC W/O DIFF    Collection Time: 06/16/19  4:10 AM   Result Value Ref Range    WBC 12.6 (H) 4.3 - 11.1 K/uL    RBC 4.91 4.23 - 5.6 M/uL    HGB 14.6 13.6 - 17.2 g/dL    HCT 46.9 41.1 - 50.3 %    MCV 95.5 79.6 - 97.8 FL    MCH 29.7 26.1 - 32.9 PG    MCHC 31.1 (L) 31.4 - 35.0 g/dL    RDW 12.9 11.9 - 14.6 %    PLATELET 059 808 - 849 K/uL    MPV 10.7 9.4 - 12.3 FL    ABSOLUTE NRBC 0.00 0.0 - 0.2 K/uL   METABOLIC PANEL, BASIC    Collection Time: 06/16/19  4:10 AM   Result Value Ref Range    Sodium 139 136 - 145 mmol/L    Potassium 4.2 3.5 - 5.1 mmol/L    Chloride 104 98 - 107 mmol/L    CO2 28 21 - 32 mmol/L    Anion gap 7 7 - 16 mmol/L    Glucose 114 (H) 65 - 100 mg/dL    BUN 11 8 - 23 MG/DL    Creatinine 0.92 0.8 - 1.5 MG/DL    GFR est AA >60 >60 ml/min/1.73m2    GFR est non-AA >60 >60 ml/min/1.73m2    Calcium 8.5 8.3 - 10.4 MG/DL       Assessment:     Principal Problem:    Small Bowel Enteritis on CT (6/13/2019)    Active Problems:    Tobacco use disorder (5/26/2015)      Generalized abdominal pain (6/13/2019)      SBO (small bowel obstruction) (HCC) (6/13/2019)      Leukocytosis (6/13/2019)      3.2cm AAA on CT 6/13/19 (6/14/2019)      Hiatal hernia (6/14/2019)        Plan:     1. Distal small bowel obstruction, currently improving  2. Continue bowel rest, IV fluid, NG tube decompression   3. Increased flatus yesterday. Not ready for p.o. intake yet   4. Leukocytosis improved, hemoglobin remains normal.   5. He will likely require outpatient colonoscopy after current episode resolves   6.  Planning repeat CT next day or 2 per surgery    David Jones MD  [facility]

## 2019-06-17 LAB
ANION GAP SERPL CALC-SCNC: 7 MMOL/L (ref 7–16)
BUN SERPL-MCNC: 15 MG/DL (ref 8–23)
CALCIUM SERPL-MCNC: 8.7 MG/DL (ref 8.3–10.4)
CHLORIDE SERPL-SCNC: 104 MMOL/L (ref 98–107)
CO2 SERPL-SCNC: 28 MMOL/L (ref 21–32)
CREAT SERPL-MCNC: 0.79 MG/DL (ref 0.8–1.5)
ERYTHROCYTE [DISTWIDTH] IN BLOOD BY AUTOMATED COUNT: 12.6 % (ref 11.9–14.6)
GLUCOSE SERPL-MCNC: 91 MG/DL (ref 65–100)
HCT VFR BLD AUTO: 47.1 % (ref 41.1–50.3)
HGB BLD-MCNC: 15.3 G/DL (ref 13.6–17.2)
MCH RBC QN AUTO: 30.7 PG (ref 26.1–32.9)
MCHC RBC AUTO-ENTMCNC: 32.5 G/DL (ref 31.4–35)
MCV RBC AUTO: 94.4 FL (ref 79.6–97.8)
MM INDURATION POC: 0 MM (ref 0–5)
NRBC # BLD: 0 K/UL (ref 0–0.2)
PLATELET # BLD AUTO: 227 K/UL (ref 150–450)
PMV BLD AUTO: 10.4 FL (ref 9.4–12.3)
POTASSIUM SERPL-SCNC: 3.9 MMOL/L (ref 3.5–5.1)
PPD POC: NEGATIVE NEGATIVE
RBC # BLD AUTO: 4.99 M/UL (ref 4.23–5.6)
SODIUM SERPL-SCNC: 139 MMOL/L (ref 136–145)
WBC # BLD AUTO: 11.4 K/UL (ref 4.3–11.1)

## 2019-06-17 PROCEDURE — 77030019605

## 2019-06-17 PROCEDURE — 36415 COLL VENOUS BLD VENIPUNCTURE: CPT

## 2019-06-17 PROCEDURE — 85027 COMPLETE CBC AUTOMATED: CPT

## 2019-06-17 PROCEDURE — 65270000029 HC RM PRIVATE

## 2019-06-17 PROCEDURE — 80048 BASIC METABOLIC PNL TOTAL CA: CPT

## 2019-06-17 PROCEDURE — 74011250636 HC RX REV CODE- 250/636: Performed by: SURGERY

## 2019-06-17 RX ADMIN — FAMOTIDINE 20 MG: 10 INJECTION, SOLUTION INTRAVENOUS at 02:51

## 2019-06-17 RX ADMIN — METRONIDAZOLE 500 MG: 500 INJECTION, SOLUTION INTRAVENOUS at 11:22

## 2019-06-17 RX ADMIN — FAMOTIDINE 20 MG: 10 INJECTION, SOLUTION INTRAVENOUS at 16:03

## 2019-06-17 RX ADMIN — ENOXAPARIN SODIUM 40 MG: 40 INJECTION SUBCUTANEOUS at 08:32

## 2019-06-17 RX ADMIN — METRONIDAZOLE 500 MG: 500 INJECTION, SOLUTION INTRAVENOUS at 02:51

## 2019-06-17 RX ADMIN — DEXTROSE MONOHYDRATE, SODIUM CHLORIDE, AND POTASSIUM CHLORIDE 100 ML/HR: 50; 4.5; 1.49 INJECTION, SOLUTION INTRAVENOUS at 22:20

## 2019-06-17 RX ADMIN — METRONIDAZOLE 500 MG: 500 INJECTION, SOLUTION INTRAVENOUS at 22:19

## 2019-06-17 RX ADMIN — METRONIDAZOLE 500 MG: 500 INJECTION, SOLUTION INTRAVENOUS at 16:04

## 2019-06-17 RX ADMIN — CIPROFLOXACIN 400 MG: 2 INJECTION, SOLUTION INTRAVENOUS at 05:52

## 2019-06-17 RX ADMIN — CIPROFLOXACIN 400 MG: 2 INJECTION, SOLUTION INTRAVENOUS at 18:28

## 2019-06-17 NOTE — PROGRESS NOTES
H&P/Consult Note/Progress Note/Office Note:   Douglas Loving  MRN: [id number]  :1955  Age:63 y.o.    HPI: Douglas Loving is a 58 y.o. male who was admitted from the ER on 19 after presenting with   a 1 day h/o progressive, constant, severe, non-radiating, generalized abd pain. Nothing made pain better or worse. No associated N/V/F/C or diarrhea  Normally has 1-2 solid BMs qday but lately BM only every other day  o h/o bloody diarrhea or sign diarrhea issues    WBC 12.7k  Surgery consulted after CT showed acute enteritis with component of underlying obstrauction  No h/o Crohn's or lymphoma  He is s/p appy in 1990s        19 CT abd/pelvis with PO and IV contrast  - Liver: Within normal limits. - Gallbladder and bile ducts: Within normal limits. - Spleen: Within normal limits. - Urinary tract: Within normal limits. - Adrenals: Within normal limits. - Pancreas: Within normal limits. - Gastrointestinal tract: There is an approximate 15 cm long segment of small  bowel wall thickening in the pelvis, in the region of the distal ileum, with  mesenteric hyperemia and trace free pelvic fluid. Findings are consistent with  acute enteritis. The more proximal small bowel loops are mildly distended,  suggesting a component of underlying obstruction as well. The terminal ileum has  a normal appearance. The colon is unremarkable. The appendix is not definitely  identified. No focal abscess or free air is identified.  - Retroperitoneum: There is a 3.2 cm fusiform infrarenal abdominal aortic  aneurysm, without evidence of dissection or rupture. - Peritoneal cavity and abdominal wall: Within normal limits. - Pelvis: Within normal limits. - Spine/bones: No acute process. - Other comments: The lung bases are clear. There is a small hiatal hernia. IMPRESSION:   1. Acute enteritis involving an approximately 15 cm long segment of pelvic small  bowel, with an associated mild small bowel obstruction. There is trace free  fluid in the pelvis as well.  2. 3.2 cm infrarenal abdominal aortic aneurysm. 3. Small hiatal hernia      Additional hx:  6/15/19 passing flatus; pain better  6/16/19 passing more flatus, no bm yet; some lower abd pain after getting up to BR earlier  6/17/19 passing more flatus; no BM yet; AF; no abd pain          Past Medical History:   Diagnosis Date    Arteritis, unspecified ([facility] [address]     Asthma     Body mass index between 19-24, adult     Bronchiolitis     CA in situ skin     Chronic airway obstruction, not elsewhere classified ([facility] [address] 5/26/2015    Coccyx disorder     Colon polyps     Crushing injury     Dermatitis     dermatitis/urticaria    ED (erectile dysfunction)     Esophageal reflux 5/26/2015    Herpes zoster     Impotence of organic origin     Lumbar back pain     Pain in limb     Reflux esophagitis     Skin disorder     Tobacco use disorder 5/26/2015     Past Surgical History:   Procedure Laterality Date    HX HEART CATHETERIZATION  1999    HX HEMORRHOIDECTOMY  1999     Current Facility-Administered Medications   Medication Dose Route Frequency    famotidine (PF) (PEPCID) injection 20 mg  20 mg IntraVENous Q12H    enoxaparin (LOVENOX) injection 40 mg  40 mg SubCUTAneous Q24H    acetaminophen (TYLENOL) tablet 1,000 mg  1,000 mg Oral Q6H PRN    ondansetron (ZOFRAN) injection 4 mg  4 mg IntraVENous Q4H PRN    dextrose 5% - 0.45% NaCl with KCl 20 mEq/L infusion  100 mL/hr IntraVENous CONTINUOUS    morphine 10 mg/ml injection 2-4 mg  2-4 mg IntraVENous Q1H PRN    phenol throat spray (CHLORASEPTIC) 1 Spray  1 Spray Oral PRN    ciprofloxacin (CIPRO) 400 mg in D5W IVPB (premix)  400 mg IntraVENous Q12H    metroNIDAZOLE (FLAGYL) IVPB premix 500 mg  500 mg IntraVENous Q6H     Patient has no known allergies.   Social History     Socioeconomic History    Marital status:      Spouse name: Not on file    Number of children: Not on file    Years of education: Not on file    Highest education level: Not on file   Tobacco Use    Smoking status: Current Every Day Smoker     Packs/day: 1.00     Years: 42.00     Pack years: 42.00    Smokeless tobacco: Former User     Quit date: 1/1/1991   Substance and Sexual Activity    Alcohol use: No    Drug use: No     Social History     Tobacco Use   Smoking Status Current Every Day Smoker    Packs/day: 1.00    Years: 42.00    Pack years: 42.00   Smokeless Tobacco Former User    Quit date: 1/1/1991     Family History   Problem Relation Age of Onset    Stroke Father         CVA    Heart Disease Father     Cancer Mother         Lung     ROS: The patient has no difficulty with chest pain or shortness of breath. No fever or chills. Comprehensive review of systems was otherwise unremarkable except as noted above. Physical Exam:   Visit Vitals  /79   Pulse (!) 58   Temp 98.1 °F (36.7 °C)   Resp 18   Ht 6' 1\" (1.854 m)   Wt 177 lb (80.3 kg)   SpO2 94%   BMI 23.35 kg/m²     Vitals:    06/16/19 1548 06/16/19 1946 06/16/19 2329 06/17/19 0302   BP: 160/82 158/77 142/78 134/79   Pulse: 60 69 65 (!) 58   Resp: 18 18 17 18   Temp: 98.6 °F (37 °C) 98.2 °F (36.8 °C) 98.2 °F (36.8 °C) 98.1 °F (36.7 °C)   SpO2: 95% 94% 95% 94%   Weight:       Height:         06/16 1901 - 06/17 0700  In: 68 [I.V.:68]  Out: 1000 [Urine:500]  06/15 0701 - 06/16 1900  In: 3762 [I.V.:3762]  Out: 3150 [Urine:1150]    Constitutional: Alert, oriented, cooperative patient in no acute distress; appears stated age    Eyes:Sclera are clear. EOMs intact  ENMT: no external lesions gross hearing normal; no obvious neck masses, no ear or lip lesions, nares normal; +NGT  CV: RRR. Normal perfusion  Resp: No JVD. Breathing is  non-labored; no audible wheezing. GI: softer, no palp mass; minimal lower abd tenderness; no peritoneal signs     Musculoskeletal: unremarkable with normal function. No embolic signs or cyanosis.    Neuro:  Oriented; moves all 4; no focal deficits  Psychiatric: normal affect and mood, no memory impairment    Recent vitals (if inpt):  Patient Vitals for the past 24 hrs:   BP Temp Pulse Resp SpO2   06/17/19 0302 134/79 98.1 °F (36.7 °C) (!) 58 18 94 %   06/16/19 2329 142/78 98.2 °F (36.8 °C) 65 17 95 %   06/16/19 1946 158/77 98.2 °F (36.8 °C) 69 18 94 %   06/16/19 1548 160/82 98.6 °F (37 °C) 60 18 95 %   06/16/19 1201 150/87 97.7 °F (36.5 °C) 66 18 99 %   06/16/19 0746 139/73 98 °F (36.7 °C) 60 18 92 %       Labs:  Recent Labs     06/17/19  0342   WBC 11.4*   HGB 15.3         K 3.9      CO2 28   BUN 15   CREA 0.79*   GLU 91       Lab Results   Component Value Date/Time    WBC 11.4 (H) 06/17/2019 03:42 AM    HGB 15.3 06/17/2019 03:42 AM    PLATELET 101 27/80/8302 03:42 AM    Sodium 139 06/17/2019 03:42 AM    Potassium 3.9 06/17/2019 03:42 AM    Chloride 104 06/17/2019 03:42 AM    CO2 28 06/17/2019 03:42 AM    BUN 15 06/17/2019 03:42 AM    Creatinine 0.79 (L) 06/17/2019 03:42 AM    Glucose 91 06/17/2019 03:42 AM    Bilirubin, total 0.4 06/13/2019 08:16 PM    AST (SGOT) 13 (L) 06/13/2019 08:16 PM    ALT (SGPT) 23 06/13/2019 08:16 PM    Alk. phosphatase 85 06/13/2019 08:16 PM       CT Results  (Last 48 hours)    None        chest X-ray      I reviewed recent labs, recent radiologic studies, and pertinent records including other doctor notes if needed. I independently reviewed radiology images for studies I described above or studies I have ordered.    Admission date (for inpatients): 6/13/2019   * No surgery found *  * No surgery found *    ASSESSMENT/PLAN:  Problem List  Date Reviewed: 6/13/2019          Codes Class Noted    3.2cm AAA on CT 6/13/19 ICD-10-CM: I71.4  ICD-9-CM: 441.4  6/14/2019        Hiatal hernia ICD-10-CM: K44.9  ICD-9-CM: 553.3  6/14/2019        Generalized abdominal pain ICD-10-CM: R10.84  ICD-9-CM: 789.07  6/13/2019        * (Principal) Small Bowel Enteritis on CT ICD-10-CM: K52.9  ICD-9-CM: 558.9  6/13/2019 SBO (small bowel obstruction) ([facility] ICD-10-CM: K56.609  ICD-9-CM: 560.9  6/13/2019        Leukocytosis ICD-10-CM: L22.506  ICD-9-CM: 288.60  6/13/2019        Tobacco use disorder ICD-10-CM: F17.200  ICD-9-CM: 305.1  5/26/2015        Esophageal reflux ICD-10-CM: K21.9  ICD-9-CM: 530.81  5/26/2015        Chronic airway obstruction, not elsewhere classified ICD-10-CM: J44.9  ICD-9-CM: 315  5/26/2015            Principal Problem:    Small Bowel Enteritis on CT (6/13/2019)    Active Problems:    Tobacco use disorder (5/26/2015)      Generalized abdominal pain (6/13/2019)      SBO (small bowel obstruction) ([facility] (6/13/2019)      Leukocytosis (6/13/2019)      3.2cm AAA on CT 6/13/19 (6/14/2019)      Hiatal hernia (6/14/2019)         Small Bowel Enteritis with secondary SBO  Repeat CT without contrast (to avoid SB distention should SBO be unresolved) tomorrow  DDx Infectious, Inflammatory, Ischemic   Passing flatus each day  Some slight worsening of lower abd pain after getting up to BR in am of 6/16/19; resolved by pm of 6/16/19    NPO/NGT/IVF/Bowel rest  IV Flagyl/Cipro   Stool cultures if any diarrhea  GI will likely perform colonoscopy as outpt after acute illness resolves and follow for possible Crohn's      Abd pain  Prn pain meds    Leukocytosis  Improving each day  Wbc improved 12.6 (6/16/19)-->11.4 (6/17/19)  Follow    3.2cm AAA on CT 6/13/19  Refer to vascular surgery as outpt for follow-up    Hiatal hernia on CT 6/13/19  Asymptomatic, no surgery planned        I have personally performed a face-to-face diagnostic evaluation and management  service on this patient. I have independently seen the patient. I have independently obtained the above history from the patient/family. I have independently examined the patient with above findings. I have independently reviewed data/labs for this patient and developed the above plan of care (MDM). Signed: Lupe Gibbs.  Oliver Ram MD, FACS

## 2019-06-17 NOTE — PROGRESS NOTES
END OF SHIFT NOTE:    INTAKE/OUTPUT  06/16 0701 - 06/17 0700  In: 1913 [I.V.:1913]  Out: 3277 [Urine:1225]  Voiding: YES  Catheter: NO  Drain:   Nasogastric Tube 06/13/19 (Active)   Site Assessment Clean, dry, & intact 6/17/2019  4:10 PM   Securement Device Tape 6/17/2019  4:10 PM   G Port Status Intermittent Suction 6/17/2019  4:10 PM   External Insertion Simone (cms) 60 cms 6/14/2019  7:50 PM   Action Taken Retaped 6/17/2019  2:50 AM   Drainage Description Ifeoma Denney 6/17/2019  4:10 PM   Drainage Chamber Level (ml) 250 ml 6/17/2019  6:30 PM   Output (ml) 700 ml 6/17/2019  4:10 PM               Flatus: Patient does have flatus present. Stool:  0 occurrences. Characteristics:       Emesis: 0 occurrences. Characteristics:        VITAL SIGNS  Patient Vitals for the past 12 hrs:   Temp Pulse Resp BP SpO2   06/17/19 1553 99 °F (37.2 °C) 69 17 136/88 95 %   06/17/19 1103 98.1 °F (36.7 °C) 71 18 139/85 96 %       Pain Assessment  Pain Intensity 1: 0 (06/17/19 1610)  Pain Location 1: Abdomen  Pain Intervention(s) 1: Medication (see MAR)  Patient Stated Pain Goal: 0    Ambulating  Yes. Patient sat up in chair during the morning portion of the shift. Patient has rested in bed most of the day. Patient has not c/o pain this shift. Shift report given to oncoming nurse at the bedside.     Debra Hunt RN

## 2019-06-17 NOTE — PROGRESS NOTES
[facility] Progress Note         Admit Date:  6/13/2019    Today's Date:  6/17/2019    CC:  Abdominal pain    Subjective:     Patient reports no n/v but did have recurrence of abd pain last night after ambulating in halls. Passing flatus but no stool. Medications:   Current Facility-Administered Medications   Medication Dose Route Frequency    famotidine (PF) (PEPCID) injection 20 mg  20 mg IntraVENous Q12H    enoxaparin (LOVENOX) injection 40 mg  40 mg SubCUTAneous Q24H    acetaminophen (TYLENOL) tablet 1,000 mg  1,000 mg Oral Q6H PRN    ondansetron (ZOFRAN) injection 4 mg  4 mg IntraVENous Q4H PRN    dextrose 5% - 0.45% NaCl with KCl 20 mEq/L infusion  100 mL/hr IntraVENous CONTINUOUS    morphine 10 mg/ml injection 2-4 mg  2-4 mg IntraVENous Q1H PRN    phenol throat spray (CHLORASEPTIC) 1 Spray  1 Spray Oral PRN    ciprofloxacin (CIPRO) 400 mg in D5W IVPB (premix)  400 mg IntraVENous Q12H    metroNIDAZOLE (FLAGYL) IVPB premix 500 mg  500 mg IntraVENous Q6H       Review of Systems:  ROS was obtained, with pertinent positives as listed above. No chest pain or SOB. Diet:      Objective:   Vitals:  Visit Vitals  /77   Pulse 62   Temp 99.4 °F (37.4 °C)   Resp 18   Ht 6' 1\" (1.854 m)   Wt 80.3 kg (177 lb)   SpO2 94%   BMI 23.35 kg/m²     Intake/Output:  06/17 0701 - 06/17 1900  In: 862 [I.V.:842]  Out: -   06/15 1901 - 06/17 0700  In: 2239 [I.V.:2239]  Out: 3200 [Urine:1650]  Exam:  General appearance: alert, cooperative, no distress  Lungs: clear to auscultation bilaterally anteriorly  Heart: regular rate and rhythm  Abdomen: soft, non-tender.  Bowel sounds normal. No masses, no organomegaly  Extremities: extremities normal, atraumatic, no cyanosis or edema  Neuro:  alert and oriented    Data Review (Labs):    Recent Labs     06/17/19  0342 06/16/19  0410 06/15/19  0230   WBC 11.4* 12.6* 13.5*   HGB 15.3 14.6 14.7   HCT 47.1 46.9 46.5    250 247   MCV 94.4 95.5 95.3   NA 139 139 141   K 3.9 4.2 3.9    104 107   CO2 28 28 27   BUN 15 11 11   CREA 0.79* 0.92 0.95   CA 8.7 8.5 8.3   GLU 91 114* 133*       6/13/19 CT abd/pelvis with PO and IV contrast  - Liver: Within normal limits. - Gallbladder and bile ducts: Within normal limits. - Spleen: Within normal limits. - Urinary tract: Within normal limits. - Adrenals: Within normal limits. - Pancreas: Within normal limits. - Gastrointestinal tract: There is an approximate 15 cm long segment of small  bowel wall thickening in the pelvis, in the region of the distal ileum, with  mesenteric hyperemia and trace free pelvic fluid. Findings are consistent with  acute enteritis. The more proximal small bowel loops are mildly distended,  suggesting a component of underlying obstruction as well. The terminal ileum has  a normal appearance. The colon is unremarkable. The appendix is not definitely  identified. No focal abscess or free air is identified.  - Retroperitoneum: There is a 3.2 cm fusiform infrarenal abdominal aortic  aneurysm, without evidence of dissection or rupture. - Peritoneal cavity and abdominal wall: Within normal limits. - Pelvis: Within normal limits. - Spine/bones: No acute process. - Other comments: The lung bases are clear. There is a small hiatal hernia.   IMPRESSION:   1. Acute enteritis involving an approximately 15 cm long segment of pelvic small  bowel, with an associated mild small bowel obstruction. There is trace free  fluid in the pelvis as well.  2. 3.2 cm infrarenal abdominal aortic aneurysm.   3. Small hiatal hernia             Assessment:     Principal Problem:    Small Bowel Enteritis on CT (6/13/2019)    Active Problems:    Tobacco use disorder (5/26/2015)      Generalized abdominal pain (6/13/2019)      SBO (small bowel obstruction) ([facility] [address] (6/13/2019)      Leukocytosis (6/13/2019)      3.2cm AAA on CT 6/13/19 (6/14/2019)      Hiatal hernia (6/14/2019)        Plan:        58 y.o. male with history of COPD, skin cancer, colon polyps, and GERD, admitted with sudden onset mid-abd pain with CT A/P 6/13/19 with a 15 cm segment of SB wall thickening in the pelvis, along with findings of elevated WBC. Surgery consult with ng tube to continuous suction placed for decompression; currently clamped for shower. 1250 ml out of ng tube in last 24 hours. Placed on IV Cipro and Flagyl. WBC down to 11.4 today. Last colo reported approximately 10 years ago with polypectomy. 1.  Continue Cipro and Flagyl IV (day # 4)  2. Tentative CT abd/pelvis on Tuesday per surgery's note  3. NG tube to suction for bowel rest  4. NPO  5.  Plan outpatient colonoscopy for evaluation as long as clinical status continues to improve    Patient is seen and examined in collaboration with Dr. Malgorzata Kunz. Assessment and plan as per Dr. Malgorzata Kunz.   Raman Corado NP

## 2019-06-18 ENCOUNTER — ANESTHESIA EVENT (OUTPATIENT)
Dept: SURGERY | Age: 64
DRG: 357 | End: 2019-06-18
Payer: COMMERCIAL

## 2019-06-18 ENCOUNTER — ANESTHESIA (OUTPATIENT)
Dept: SURGERY | Age: 64
DRG: 357 | End: 2019-06-18
Payer: COMMERCIAL

## 2019-06-18 ENCOUNTER — APPOINTMENT (OUTPATIENT)
Dept: CT IMAGING | Age: 64
DRG: 357 | End: 2019-06-18
Attending: SURGERY
Payer: COMMERCIAL

## 2019-06-18 PROBLEM — K63.89 MESENTERIC MASS: Status: ACTIVE | Noted: 2019-06-18

## 2019-06-18 PROBLEM — K45.8 INTERNAL HERNIA: Status: ACTIVE | Noted: 2019-06-18

## 2019-06-18 PROBLEM — R34 OLIGURIA: Status: ACTIVE | Noted: 2019-06-18

## 2019-06-18 LAB
ABO + RH BLD: NORMAL
ANION GAP SERPL CALC-SCNC: 7 MMOL/L (ref 7–16)
BLOOD GROUP ANTIBODIES SERPL: NORMAL
BUN SERPL-MCNC: 19 MG/DL (ref 8–23)
CALCIUM SERPL-MCNC: 9.3 MG/DL (ref 8.3–10.4)
CHLORIDE SERPL-SCNC: 101 MMOL/L (ref 98–107)
CO2 SERPL-SCNC: 31 MMOL/L (ref 21–32)
CREAT SERPL-MCNC: 0.98 MG/DL (ref 0.8–1.5)
ERYTHROCYTE [DISTWIDTH] IN BLOOD BY AUTOMATED COUNT: 12.6 % (ref 11.9–14.6)
GLUCOSE SERPL-MCNC: 102 MG/DL (ref 65–100)
HCT VFR BLD AUTO: 48.7 % (ref 41.1–50.3)
HGB BLD-MCNC: 15.6 G/DL (ref 13.6–17.2)
MCH RBC QN AUTO: 30.2 PG (ref 26.1–32.9)
MCHC RBC AUTO-ENTMCNC: 32 G/DL (ref 31.4–35)
MCV RBC AUTO: 94.2 FL (ref 79.6–97.8)
NRBC # BLD: 0 K/UL (ref 0–0.2)
PLATELET # BLD AUTO: 263 K/UL (ref 150–450)
PMV BLD AUTO: 10.5 FL (ref 9.4–12.3)
POTASSIUM SERPL-SCNC: 3.9 MMOL/L (ref 3.5–5.1)
RBC # BLD AUTO: 5.17 M/UL (ref 4.23–5.6)
SODIUM SERPL-SCNC: 139 MMOL/L (ref 136–145)
SPECIMEN EXP DATE BLD: NORMAL
WBC # BLD AUTO: 12.4 K/UL (ref 4.3–11.1)

## 2019-06-18 PROCEDURE — 76060000034 HC ANESTHESIA 1.5 TO 2 HR: Performed by: SURGERY

## 2019-06-18 PROCEDURE — 76210000016 HC OR PH I REC 1 TO 1.5 HR: Performed by: SURGERY

## 2019-06-18 PROCEDURE — 77030002996 HC SUT SLK J&J -A: Performed by: SURGERY

## 2019-06-18 PROCEDURE — 86900 BLOOD TYPING SEROLOGIC ABO: CPT

## 2019-06-18 PROCEDURE — 36415 COLL VENOUS BLD VENIPUNCTURE: CPT

## 2019-06-18 PROCEDURE — 77030020255 HC SOL INJ LR 1000ML BG

## 2019-06-18 PROCEDURE — 74011250636 HC RX REV CODE- 250/636: Performed by: SURGERY

## 2019-06-18 PROCEDURE — 74011250636 HC RX REV CODE- 250/636

## 2019-06-18 PROCEDURE — 80048 BASIC METABOLIC PNL TOTAL CA: CPT

## 2019-06-18 PROCEDURE — 65270000029 HC RM PRIVATE

## 2019-06-18 PROCEDURE — 77030011264 HC ELECTRD BLD EXT COVD -A: Performed by: SURGERY

## 2019-06-18 PROCEDURE — 77030034850: Performed by: SURGERY

## 2019-06-18 PROCEDURE — 85027 COMPLETE CBC AUTOMATED: CPT

## 2019-06-18 PROCEDURE — 74011000250 HC RX REV CODE- 250: Performed by: SURGERY

## 2019-06-18 PROCEDURE — 0WJP0ZZ INSPECTION OF GASTROINTESTINAL TRACT, OPEN APPROACH: ICD-10-PCS | Performed by: SURGERY

## 2019-06-18 PROCEDURE — 77030019940 HC BLNKT HYPOTHRM STRY -B: Performed by: ANESTHESIOLOGY

## 2019-06-18 PROCEDURE — 74176 CT ABD & PELVIS W/O CONTRAST: CPT

## 2019-06-18 PROCEDURE — 88305 TISSUE EXAM BY PATHOLOGIST: CPT

## 2019-06-18 PROCEDURE — 77030018836 HC SOL IRR NACL ICUM -A: Performed by: SURGERY

## 2019-06-18 PROCEDURE — 74011250636 HC RX REV CODE- 250/636: Performed by: ANESTHESIOLOGY

## 2019-06-18 PROCEDURE — 77030008462 HC STPLR SKN PROX J&J -A: Performed by: SURGERY

## 2019-06-18 PROCEDURE — 77030019908 HC STETH ESOPH SIMS -A: Performed by: ANESTHESIOLOGY

## 2019-06-18 PROCEDURE — 77030032490 HC SLV COMPR SCD KNE COVD -B: Performed by: SURGERY

## 2019-06-18 PROCEDURE — 0DBV0ZZ EXCISION OF MESENTERY, OPEN APPROACH: ICD-10-PCS | Performed by: SURGERY

## 2019-06-18 PROCEDURE — 77030037088 HC TUBE ENDOTRACH ORAL NSL COVD-A: Performed by: ANESTHESIOLOGY

## 2019-06-18 PROCEDURE — 77030039425 HC BLD LARYNG TRULITE DISP TELE -A: Performed by: ANESTHESIOLOGY

## 2019-06-18 PROCEDURE — 77030002966 HC SUT PDS J&J -A: Performed by: SURGERY

## 2019-06-18 PROCEDURE — 74011000250 HC RX REV CODE- 250

## 2019-06-18 PROCEDURE — C9113 INJ PANTOPRAZOLE SODIUM, VIA: HCPCS | Performed by: SURGERY

## 2019-06-18 PROCEDURE — 76010000161 HC OR TIME 1 TO 1.5 HR INTENSV-TIER 1: Performed by: SURGERY

## 2019-06-18 RX ORDER — PROPOFOL 10 MG/ML
INJECTION, EMULSION INTRAVENOUS AS NEEDED
Status: DISCONTINUED | OUTPATIENT
Start: 2019-06-18 | End: 2019-06-18 | Stop reason: HOSPADM

## 2019-06-18 RX ORDER — ACETAMINOPHEN 10 MG/ML
1000 INJECTION, SOLUTION INTRAVENOUS ONCE
Status: COMPLETED | OUTPATIENT
Start: 2019-06-18 | End: 2019-06-18

## 2019-06-18 RX ORDER — ROPIVACAINE HYDROCHLORIDE 5 MG/ML
INJECTION, SOLUTION EPIDURAL; INFILTRATION; PERINEURAL AS NEEDED
Status: DISCONTINUED | OUTPATIENT
Start: 2019-06-18 | End: 2019-06-18 | Stop reason: HOSPADM

## 2019-06-18 RX ORDER — SODIUM CHLORIDE, SODIUM LACTATE, POTASSIUM CHLORIDE, CALCIUM CHLORIDE 600; 310; 30; 20 MG/100ML; MG/100ML; MG/100ML; MG/100ML
200 INJECTION, SOLUTION INTRAVENOUS CONTINUOUS
Status: DISCONTINUED | OUTPATIENT
Start: 2019-06-18 | End: 2019-06-21

## 2019-06-18 RX ORDER — LIDOCAINE HYDROCHLORIDE 20 MG/ML
INJECTION, SOLUTION EPIDURAL; INFILTRATION; INTRACAUDAL; PERINEURAL AS NEEDED
Status: DISCONTINUED | OUTPATIENT
Start: 2019-06-18 | End: 2019-06-18 | Stop reason: HOSPADM

## 2019-06-18 RX ORDER — KETOROLAC TROMETHAMINE 30 MG/ML
INJECTION, SOLUTION INTRAMUSCULAR; INTRAVENOUS AS NEEDED
Status: DISCONTINUED | OUTPATIENT
Start: 2019-06-18 | End: 2019-06-18 | Stop reason: HOSPADM

## 2019-06-18 RX ORDER — DEXAMETHASONE SODIUM PHOSPHATE 100 MG/10ML
INJECTION INTRAMUSCULAR; INTRAVENOUS AS NEEDED
Status: DISCONTINUED | OUTPATIENT
Start: 2019-06-18 | End: 2019-06-18 | Stop reason: HOSPADM

## 2019-06-18 RX ORDER — ROCURONIUM BROMIDE 10 MG/ML
INJECTION, SOLUTION INTRAVENOUS AS NEEDED
Status: DISCONTINUED | OUTPATIENT
Start: 2019-06-18 | End: 2019-06-18 | Stop reason: HOSPADM

## 2019-06-18 RX ORDER — SODIUM CHLORIDE, SODIUM LACTATE, POTASSIUM CHLORIDE, CALCIUM CHLORIDE 600; 310; 30; 20 MG/100ML; MG/100ML; MG/100ML; MG/100ML
INJECTION, SOLUTION INTRAVENOUS
Status: DISCONTINUED | OUTPATIENT
Start: 2019-06-18 | End: 2019-06-18 | Stop reason: HOSPADM

## 2019-06-18 RX ORDER — DEXAMETHASONE SODIUM PHOSPHATE 4 MG/ML
INJECTION, SOLUTION INTRA-ARTICULAR; INTRALESIONAL; INTRAMUSCULAR; INTRAVENOUS; SOFT TISSUE AS NEEDED
Status: DISCONTINUED | OUTPATIENT
Start: 2019-06-18 | End: 2019-06-18 | Stop reason: HOSPADM

## 2019-06-18 RX ORDER — SUCCINYLCHOLINE CHLORIDE 20 MG/ML
INJECTION INTRAMUSCULAR; INTRAVENOUS AS NEEDED
Status: DISCONTINUED | OUTPATIENT
Start: 2019-06-18 | End: 2019-06-18 | Stop reason: HOSPADM

## 2019-06-18 RX ORDER — FENTANYL CITRATE 50 UG/ML
INJECTION, SOLUTION INTRAMUSCULAR; INTRAVENOUS AS NEEDED
Status: DISCONTINUED | OUTPATIENT
Start: 2019-06-18 | End: 2019-06-18 | Stop reason: HOSPADM

## 2019-06-18 RX ORDER — ONDANSETRON 2 MG/ML
INJECTION INTRAMUSCULAR; INTRAVENOUS AS NEEDED
Status: DISCONTINUED | OUTPATIENT
Start: 2019-06-18 | End: 2019-06-18 | Stop reason: HOSPADM

## 2019-06-18 RX ADMIN — KETOROLAC TROMETHAMINE 30 MG: 30 INJECTION, SOLUTION INTRAMUSCULAR; INTRAVENOUS at 18:19

## 2019-06-18 RX ADMIN — CIPROFLOXACIN 400 MG: 2 INJECTION, SOLUTION INTRAVENOUS at 21:16

## 2019-06-18 RX ADMIN — SODIUM CHLORIDE, SODIUM LACTATE, POTASSIUM CHLORIDE, CALCIUM CHLORIDE: 600; 310; 30; 20 INJECTION, SOLUTION INTRAVENOUS at 17:40

## 2019-06-18 RX ADMIN — SODIUM CHLORIDE 40 MG: 9 INJECTION, SOLUTION INTRAMUSCULAR; INTRAVENOUS; SUBCUTANEOUS at 09:10

## 2019-06-18 RX ADMIN — ACETAMINOPHEN 1000 MG: 10 INJECTION, SOLUTION INTRAVENOUS at 19:50

## 2019-06-18 RX ADMIN — ENOXAPARIN SODIUM 40 MG: 40 INJECTION SUBCUTANEOUS at 09:10

## 2019-06-18 RX ADMIN — PROPOFOL 200 MG: 10 INJECTION, EMULSION INTRAVENOUS at 17:34

## 2019-06-18 RX ADMIN — ROCURONIUM BROMIDE 5 MG: 10 INJECTION, SOLUTION INTRAVENOUS at 17:34

## 2019-06-18 RX ADMIN — METRONIDAZOLE 500 MG: 500 INJECTION, SOLUTION INTRAVENOUS at 16:58

## 2019-06-18 RX ADMIN — METRONIDAZOLE 500 MG: 500 INJECTION, SOLUTION INTRAVENOUS at 23:22

## 2019-06-18 RX ADMIN — FENTANYL CITRATE 100 MCG: 50 INJECTION, SOLUTION INTRAMUSCULAR; INTRAVENOUS at 17:34

## 2019-06-18 RX ADMIN — PROPOFOL 30 MG: 10 INJECTION, EMULSION INTRAVENOUS at 18:23

## 2019-06-18 RX ADMIN — SODIUM CHLORIDE, SODIUM LACTATE, POTASSIUM CHLORIDE, AND CALCIUM CHLORIDE 200 ML/HR: 600; 310; 30; 20 INJECTION, SOLUTION INTRAVENOUS at 14:50

## 2019-06-18 RX ADMIN — LIDOCAINE HYDROCHLORIDE 80 MG: 20 INJECTION, SOLUTION EPIDURAL; INFILTRATION; INTRACAUDAL; PERINEURAL at 17:34

## 2019-06-18 RX ADMIN — DEXAMETHASONE SODIUM PHOSPHATE 4 MG: 4 INJECTION, SOLUTION INTRA-ARTICULAR; INTRALESIONAL; INTRAMUSCULAR; INTRAVENOUS; SOFT TISSUE at 18:10

## 2019-06-18 RX ADMIN — DEXAMETHASONE SODIUM PHOSPHATE 10 MG: 100 INJECTION INTRAMUSCULAR; INTRAVENOUS at 20:05

## 2019-06-18 RX ADMIN — SUCCINYLCHOLINE CHLORIDE 160 MG: 20 INJECTION INTRAMUSCULAR; INTRAVENOUS at 17:34

## 2019-06-18 RX ADMIN — METRONIDAZOLE 500 MG: 500 INJECTION, SOLUTION INTRAVENOUS at 02:49

## 2019-06-18 RX ADMIN — CIPROFLOXACIN 400 MG: 2 INJECTION, SOLUTION INTRAVENOUS at 09:09

## 2019-06-18 RX ADMIN — ONDANSETRON 4 MG: 2 INJECTION INTRAMUSCULAR; INTRAVENOUS at 18:23

## 2019-06-18 RX ADMIN — SODIUM CHLORIDE 40 MG: 9 INJECTION, SOLUTION INTRAMUSCULAR; INTRAVENOUS; SUBCUTANEOUS at 21:18

## 2019-06-18 RX ADMIN — ROPIVACAINE HYDROCHLORIDE 30 ML: 5 INJECTION, SOLUTION EPIDURAL; INFILTRATION; PERINEURAL at 20:05

## 2019-06-18 RX ADMIN — FENTANYL CITRATE 50 MCG: 50 INJECTION, SOLUTION INTRAMUSCULAR; INTRAVENOUS at 18:47

## 2019-06-18 RX ADMIN — METRONIDAZOLE 500 MG: 500 INJECTION, SOLUTION INTRAVENOUS at 10:53

## 2019-06-18 RX ADMIN — ROCURONIUM BROMIDE 45 MG: 10 INJECTION, SOLUTION INTRAVENOUS at 17:50

## 2019-06-18 RX ADMIN — FAMOTIDINE 20 MG: 10 INJECTION, SOLUTION INTRAVENOUS at 02:49

## 2019-06-18 NOTE — PROGRESS NOTES
Pt reporting an increase in abdominal pain (aching and sore), ofirmev ordered per Dr. Rhonda Albrecht.

## 2019-06-18 NOTE — PROGRESS NOTES
H&P/Consult Note/Progress Note/Office Note:   Iftikhar Aguilar  MRN: [id number]  :1955  Age:63 y.o.    HPI: Iftikhar Aguilar is a 58 y.o. male who was admitted from the ER on 19 after presenting with   a 1 day h/o progressive, constant, severe, non-radiating, generalized abd pain. Nothing made pain better or worse. No associated N/V/F/C or diarrhea  Normally has 1-2 solid BMs qday but lately BM only every other day  o h/o bloody diarrhea or sign diarrhea issues    WBC 12.7k  Surgery consulted after CT showed acute enteritis with component of underlying obstrauction  No h/o Crohn's or lymphoma  He is s/p appy in 1990s        19 CT abd/pelvis with PO and IV contrast  - Liver: Within normal limits. - Gallbladder and bile ducts: Within normal limits. - Spleen: Within normal limits. - Urinary tract: Within normal limits. - Adrenals: Within normal limits. - Pancreas: Within normal limits. - Gastrointestinal tract: There is an approximate 15 cm long segment of small  bowel wall thickening in the pelvis, in the region of the distal ileum, with  mesenteric hyperemia and trace free pelvic fluid. Findings are consistent with  acute enteritis. The more proximal small bowel loops are mildly distended,  suggesting a component of underlying obstruction as well. The terminal ileum has  a normal appearance. The colon is unremarkable. The appendix is not definitely  identified. No focal abscess or free air is identified.  - Retroperitoneum: There is a 3.2 cm fusiform infrarenal abdominal aortic  aneurysm, without evidence of dissection or rupture. - Peritoneal cavity and abdominal wall: Within normal limits. - Pelvis: Within normal limits. - Spine/bones: No acute process. - Other comments: The lung bases are clear. There is a small hiatal hernia. IMPRESSION:   1. Acute enteritis involving an approximately 15 cm long segment of pelvic small  bowel, with an associated mild small bowel obstruction. There is trace free  fluid in the pelvis as well.  2. 3.2 cm infrarenal abdominal aortic aneurysm. 3. Small hiatal hernia        6/18/19 CT abd/pevls without oral or IV contrast    ABD:  New small right pleural effusion with associated mild dependent subsegmental  atelectasis bilateral lung bases. Normal-appearing liver, gallbladder, spleen,  pancreas, bilateral adrenal glands and kidneys. Moderate calcific  atherosclerosis of a borderline aneurysmal infrarenal abdominal aorta measuring  up to 2.6 x 3.0 cm, unchanged. No evidence of significant lymphadenopathy. New  esophageal tube tip terminating in the gastric body. Interval increase in  diffuse fluid-filled dilatation of the proximal small bowel which is not well  evaluated on this noncontrast examination, however there appears to be a sharp  transition point in the left pelvis into decompressed distal small bowel loops,  this finding likely represents worsening mechanical small bowel obstruction  secondary to adhesions or bands. No evidence of intraperitoneal free air.     PELVIS:  Normal-appearing urinary bladder. Punctate prostatic calcifications. Normal-appearing seminal vesicles. Sigmoid diverticula. Normal-appearing  proximal colon. Increased trace pelvic free fluid. No evidence of significant  inguinal or pelvic sidewall lymphadenopathy. Chronic healed fracture  deformities of the right superior pubic ramus and inferior pubic ramus appears  similar to prior. Visualized osseous structures otherwise unremarkable.     IMPRESSION:      1. Apparent interval worsening of mechanical small bowel obstruction with sharp  transition point in the left pelvis, likely secondary to adhesions or bands. 2.  Increased trace pelvic ascites, with a new small right pleural effusion.   3.  Other chronic findings as above.                    Additional hx:  6/15/19 passing flatus; pain better  6/16/19 passing more flatus, no bm yet; some lower abd pain after getting up to BR earlier  6/17/19 passing more flatus; no BM yet; AF; mild lower abd pain  6/18/19 passing flatus; 1.2-1.8 Liters NGT dark output qday;  Mild lower abd pain; repeat CT is worse as above. Surgery recommended        Past Medical History:   Diagnosis Date    Arteritis, unspecified ([facility] [address]     Asthma     Body mass index between 19-24, adult     Bronchiolitis     CA in situ skin     Chronic airway obstruction, not elsewhere classified ([facility] 5/26/2015    Coccyx disorder     Colon polyps     Crushing injury     Dermatitis     dermatitis/urticaria    ED (erectile dysfunction)     Esophageal reflux 5/26/2015    Herpes zoster     Impotence of organic origin     Lumbar back pain     Pain in limb     Reflux esophagitis     Skin disorder     Tobacco use disorder 5/26/2015     Past Surgical History:   Procedure Laterality Date    HX HEART CATHETERIZATION  1999    HX HEMORRHOIDECTOMY  1999     Current Facility-Administered Medications   Medication Dose Route Frequency    pantoprazole (PROTONIX) 40 mg in sodium chloride 0.9% 10 mL injection  40 mg IntraVENous Q12H    lactated Ringers infusion  200 mL/hr IntraVENous CONTINUOUS    enoxaparin (LOVENOX) injection 40 mg  40 mg SubCUTAneous Q24H    acetaminophen (TYLENOL) tablet 1,000 mg  1,000 mg Oral Q6H PRN    ondansetron (ZOFRAN) injection 4 mg  4 mg IntraVENous Q4H PRN    morphine 10 mg/ml injection 2-4 mg  2-4 mg IntraVENous Q1H PRN    phenol throat spray (CHLORASEPTIC) 1 Spray  1 Spray Oral PRN    ciprofloxacin (CIPRO) 400 mg in D5W IVPB (premix)  400 mg IntraVENous Q12H    metroNIDAZOLE (FLAGYL) IVPB premix 500 mg  500 mg IntraVENous Q6H     Patient has no known allergies.   Social History     Socioeconomic History    Marital status:      Spouse name: Not on file    Number of children: Not on file    Years of education: Not on file    Highest education level: Not on file   Tobacco Use    Smoking status: Current Every Day Smoker     Packs/day: 1.00     Years: 42.00     Pack years: 42.00    Smokeless tobacco: Former User     Quit date: 1/1/1991   Substance and Sexual Activity    Alcohol use: No    Drug use: No     Social History     Tobacco Use   Smoking Status Current Every Day Smoker    Packs/day: 1.00    Years: 42.00    Pack years: 42.00   Smokeless Tobacco Former User    Quit date: 1/1/1991     Family History   Problem Relation Age of Onset    Stroke Father         CVA    Heart Disease Father     Cancer Mother         Lung     ROS: The patient has no difficulty with chest pain or shortness of breath. No fever or chills. Comprehensive review of systems was otherwise unremarkable except as noted above. Physical Exam:   Visit Vitals  /82   Pulse 71   Temp 98.9 °F (37.2 °C)   Resp 16   Ht 6' 1\" (1.854 m)   Wt 177 lb (80.3 kg)   SpO2 93%   BMI 23.35 kg/m²     Vitals:    06/17/19 2243 06/18/19 0302 06/18/19 0732 06/18/19 1153   BP: 149/74 127/81 146/84 129/82   Pulse: 72 78 78 71   Resp: 16 16 16 16   Temp: 97.9 °F (36.6 °C) 98.4 °F (36.9 °C) 98.6 °F (37 °C) 98.9 °F (37.2 °C)   SpO2: 94% 95% 95% 93%   Weight:       Height:         06/18 0701 - 06/18 1900  In: -   Out: 700 [Urine:200]  06/16 1901 - 06/18 0700  In: 1910 [I.V.:1910]  Out: 4400 [Urine:1200]    Constitutional: Alert, oriented, cooperative patient in no acute distress; appears stated age    Eyes:Sclera are clear. EOMs intact  ENMT: no external lesions gross hearing normal; no obvious neck masses, no ear or lip lesions, nares normal; +NGT  CV: RRR. Normal perfusion  Resp: No JVD. Breathing is  non-labored; no audible wheezing. GI: softer, no palp mass; minimal lower abd tenderness; no peritoneal signs     Musculoskeletal: unremarkable with normal function. No embolic signs or cyanosis.    Neuro:  Oriented; moves all 4; no focal deficits  Psychiatric: normal affect and mood, no memory impairment    Recent vitals (if inpt):  Patient Vitals for the past 24 hrs:   BP Temp Pulse Resp SpO2   06/18/19 1153 129/82 98.9 °F (37.2 °C) 71 16 93 %   06/18/19 0732 146/84 98.6 °F (37 °C) 78 16 95 %   06/18/19 0302 127/81 98.4 °F (36.9 °C) 78 16 95 %   06/17/19 2243 149/74 97.9 °F (36.6 °C) 72 16 94 %   06/17/19 1943 130/80 98.4 °F (36.9 °C) 88 16 94 %   06/17/19 1553 136/88 99 °F (37.2 °C) 69 17 95 %       Labs:  Recent Labs     06/18/19  0511   WBC 12.4*   HGB 15.6         K 3.9      CO2 31   BUN 19   CREA 0.98   *       Lab Results   Component Value Date/Time    WBC 12.4 (H) 06/18/2019 05:11 AM    HGB 15.6 06/18/2019 05:11 AM    PLATELET 800 55/03/6739 05:11 AM    Sodium 139 06/18/2019 05:11 AM    Potassium 3.9 06/18/2019 05:11 AM    Chloride 101 06/18/2019 05:11 AM    CO2 31 06/18/2019 05:11 AM    BUN 19 06/18/2019 05:11 AM    Creatinine 0.98 06/18/2019 05:11 AM    Glucose 102 (H) 06/18/2019 05:11 AM    Bilirubin, total 0.4 06/13/2019 08:16 PM    AST (SGOT) 13 (L) 06/13/2019 08:16 PM    ALT (SGPT) 23 06/13/2019 08:16 PM    Alk. phosphatase 85 06/13/2019 08:16 PM       CT Results  (Last 48 hours)               06/18/19 0659  CT ABD PELV WO CONT Final result    Impression:  IMPRESSION:        1. Apparent interval worsening of mechanical small bowel obstruction with sharp   transition point in the left pelvis, likely secondary to adhesions or bands. 2.  Increased trace pelvic ascites, with a new small right pleural effusion. 3.  Other chronic findings as above. Narrative:  CT ABDOMEN AND PELVIS WITHOUT CONTRAST       HISTORY: NO oral and no IV contrast; Eval interval changes in SBO and enteritis,   63 years Male       COMPARISON: CT abdomen pelvis June 13, 2019       TECHNIQUE:  Noncontrast axial helical CT images were obtained from above the   diaphragm through the pelvis. Coronal reformatted images were obtained at the   scanner console and made available for review.      Radiation dose reduction techniques were used for this study:  Our CT scanners   use one or all of the following: Automated exposure control, adjustment of the   mA and/or kVp according to patient's size, iterative reconstruction. FINDINGS:       ABDOMEN:   New small right pleural effusion with associated mild dependent subsegmental   atelectasis bilateral lung bases. Normal-appearing liver, gallbladder, spleen,   pancreas, bilateral adrenal glands and kidneys. Moderate calcific   atherosclerosis of a borderline aneurysmal infrarenal abdominal aorta measuring   up to 2.6 x 3.0 cm, unchanged. No evidence of significant lymphadenopathy. New   esophageal tube tip terminating in the gastric body. Interval increase in   diffuse fluid-filled dilatation of the proximal small bowel which is not well   evaluated on this noncontrast examination, however there appears to be a sharp   transition point in the left pelvis into decompressed distal small bowel loops,   this finding likely represents worsening mechanical small bowel obstruction   secondary to adhesions or bands. No evidence of intraperitoneal free air. PELVIS:   Normal-appearing urinary bladder. Punctate prostatic calcifications. Normal-appearing seminal vesicles. Sigmoid diverticula. Normal-appearing   proximal colon. Increased trace pelvic free fluid. No evidence of significant   inguinal or pelvic sidewall lymphadenopathy. Chronic healed fracture   deformities of the right superior pubic ramus and inferior pubic ramus appears   similar to prior. Visualized osseous structures otherwise unremarkable. chest X-ray      I reviewed recent labs, recent radiologic studies, and pertinent records including other doctor notes if needed. I independently reviewed radiology images for studies I described above or studies I have ordered.    Admission date (for inpatients): 6/13/2019   * No surgery found *  Procedure(s):  LAPAROTOMY EXPLORATORY WITH POSSIBLE BOWEL RESECTION    ASSESSMENT/PLAN:  Problem List  Date Reviewed: 6/13/2019          Codes Class Noted    3.2cm AAA on CT 6/13/19 ICD-10-CM: I71.4  ICD-9-CM: 441.4  6/14/2019        Hiatal hernia ICD-10-CM: K44.9  ICD-9-CM: 553.3  6/14/2019        Generalized abdominal pain ICD-10-CM: R10.84  ICD-9-CM: 789.07  6/13/2019        * (Principal) Small Bowel Enteritis on CT ICD-10-CM: K52.9  ICD-9-CM: 558.9  6/13/2019        SBO (small bowel obstruction) ([facility] [address] ICD-10-CM: X22.548  ICD-9-CM: 560.9  6/13/2019        Leukocytosis ICD-10-CM: K34.510  ICD-9-CM: 288.60  6/13/2019        Tobacco use disorder ICD-10-CM: F17.200  ICD-9-CM: 305.1  5/26/2015        Esophageal reflux ICD-10-CM: K21.9  ICD-9-CM: 530.81  5/26/2015        Chronic airway obstruction, not elsewhere classified ICD-10-CM: J44.9  ICD-9-CM: 010  5/26/2015            Principal Problem:    Small Bowel Enteritis on CT (6/13/2019)    Active Problems:    Tobacco use disorder (5/26/2015)      Generalized abdominal pain (6/13/2019)      SBO (small bowel obstruction) ([facility] [address] (6/13/2019)      Leukocytosis (6/13/2019)      3.2cm AAA on CT 6/13/19 (6/14/2019)      Hiatal hernia (6/14/2019)         Small Bowel Enteritis with secondary SBO  Repeat Ct scan reviewed with patient and family. I offered and recommended surgery. We discussed risks of continued medical management vs risks of surgery. I told him given the CT images and lack of improvement despite our best efforts to decompress him with bowel rest and NGT, the obstructive process has failed to resolve. I discussed the patient's condition with the patient at length and treatment options. I discussed risks of surgery (expl lap with possible bowel resection) in language the patient could understand including bleeding, infection, need for further surgery, abscess, fistula, SBO, DVT, PE, heart attack, stroke, pneumonia, and death. The patient voiced understanding of all this and all questions were answered.   Alternatives to surgery were discussed also and risks of the alternatives. The patient requested to think about it. All questions answered. I was to check back with him later today. NPO/NGT/IVF/Bowel rest  IV Flagyl/Cipro   Stool cultures if any diarrhea  GI will likely perform colonoscopy as outpt after acute illness resolves and follow for possible Crohn's      Abd pain  Prn pain meds    Leukocytosis  Worse this am    3.2cm AAA on CT 6/13/19  Refer to vascular surgery as outpt for follow-up    Hiatal hernia on CT 6/13/19  Asymptomatic, no surgery planned    Oliguria (new problem on 6/18/19)  Likely related to persistent SBO and high NGT output  Bolus given  LR at 200cc/hr           I have personally performed a face-to-face diagnostic evaluation and management  service on this patient. I have independently seen the patient. I have independently obtained the above history from the patient/family. I have independently examined the patient with above findings. I have independently reviewed data/labs for this patient and developed the above plan of care (MDM). Signed: Earl Headley.  Montserrat Kaplan MD, FACS

## 2019-06-18 NOTE — PROGRESS NOTES
TRANSFER - OUT REPORT:    Verbal report given to MELVA Dent on Simon Moritz  being transferred to 2nd floor for routine post - op       Report consisted of patients Situation, Background, Assessment and Recommendations(SBAR). Information from the following report(s) SBAR, OR Summary, Intake/Output and MAR was reviewed with the receiving nurse. Opportunity for questions and clarification was provided.

## 2019-06-18 NOTE — PROGRESS NOTES
H&P/Consult Note/Progress Note/Office Note:   Nadeem Houston  MRN: [id number]  :1955  Age:63 y.o.    HPI: Nadeem Houston is a 58 y.o. male who was admitted from the ER on 19 after presenting with   a 1 day h/o progressive, constant, severe, non-radiating, generalized abd pain. Nothing made pain better or worse. No associated N/V/F/C or diarrhea  Normally has 1-2 solid BMs qday but lately BM only every other day  o h/o bloody diarrhea or sign diarrhea issues    WBC 12.7k  Surgery consulted after CT showed acute enteritis with component of underlying obstrauction  No h/o Crohn's or lymphoma  He is s/p appy in 1990s        19 CT abd/pelvis with PO and IV contrast  - Liver: Within normal limits. - Gallbladder and bile ducts: Within normal limits. - Spleen: Within normal limits. - Urinary tract: Within normal limits. - Adrenals: Within normal limits. - Pancreas: Within normal limits. - Gastrointestinal tract: There is an approximate 15 cm long segment of small  bowel wall thickening in the pelvis, in the region of the distal ileum, with  mesenteric hyperemia and trace free pelvic fluid. Findings are consistent with  acute enteritis. The more proximal small bowel loops are mildly distended,  suggesting a component of underlying obstruction as well. The terminal ileum has  a normal appearance. The colon is unremarkable. The appendix is not definitely  identified. No focal abscess or free air is identified.  - Retroperitoneum: There is a 3.2 cm fusiform infrarenal abdominal aortic  aneurysm, without evidence of dissection or rupture. - Peritoneal cavity and abdominal wall: Within normal limits. - Pelvis: Within normal limits. - Spine/bones: No acute process. - Other comments: The lung bases are clear. There is a small hiatal hernia. IMPRESSION:   1. Acute enteritis involving an approximately 15 cm long segment of pelvic small  bowel, with an associated mild small bowel obstruction. There is trace free  fluid in the pelvis as well.  2. 3.2 cm infrarenal abdominal aortic aneurysm. 3. Small hiatal hernia      Additional hx:  6/15/19 passing flatus; pain better  6/16/19 passing more flatus, no bm yet; some lower abd pain after getting up to BR earlier  6/17/19 passing more flatus; no BM yet; AF; mild lower abd pain  6/18/19 passing flatus; 1.2-1.8 Liters NGT dark output qday;  Mild lower abd pain; repeat CT today        Past Medical History:   Diagnosis Date    Arteritis, unspecified ([facility]     Asthma     Body mass index between 19-24, adult     Bronchiolitis     CA in situ skin     Chronic airway obstruction, not elsewhere classified ([facility] [address] 5/26/2015    Coccyx disorder     Colon polyps     Crushing injury     Dermatitis     dermatitis/urticaria    ED (erectile dysfunction)     Esophageal reflux 5/26/2015    Herpes zoster     Impotence of organic origin     Lumbar back pain     Pain in limb     Reflux esophagitis     Skin disorder     Tobacco use disorder 5/26/2015     Past Surgical History:   Procedure Laterality Date    HX HEART CATHETERIZATION  1999    HX HEMORRHOIDECTOMY  1999     Current Facility-Administered Medications   Medication Dose Route Frequency    pantoprazole (PROTONIX) 40 mg in sodium chloride 0.9% 10 mL injection  40 mg IntraVENous Q12H    enoxaparin (LOVENOX) injection 40 mg  40 mg SubCUTAneous Q24H    acetaminophen (TYLENOL) tablet 1,000 mg  1,000 mg Oral Q6H PRN    ondansetron (ZOFRAN) injection 4 mg  4 mg IntraVENous Q4H PRN    dextrose 5% - 0.45% NaCl with KCl 20 mEq/L infusion  100 mL/hr IntraVENous CONTINUOUS    morphine 10 mg/ml injection 2-4 mg  2-4 mg IntraVENous Q1H PRN    phenol throat spray (CHLORASEPTIC) 1 Spray  1 Spray Oral PRN    ciprofloxacin (CIPRO) 400 mg in D5W IVPB (premix)  400 mg IntraVENous Q12H    metroNIDAZOLE (FLAGYL) IVPB premix 500 mg  500 mg IntraVENous Q6H     Patient has no known allergies.   Social History Socioeconomic History    Marital status:      Spouse name: Not on file    Number of children: Not on file    Years of education: Not on file    Highest education level: Not on file   Tobacco Use    Smoking status: Current Every Day Smoker     Packs/day: 1.00     Years: 42.00     Pack years: 42.00    Smokeless tobacco: Former User     Quit date: 1/1/1991   Substance and Sexual Activity    Alcohol use: No    Drug use: No     Social History     Tobacco Use   Smoking Status Current Every Day Smoker    Packs/day: 1.00    Years: 42.00    Pack years: 42.00   Smokeless Tobacco Former User    Quit date: 1/1/1991     Family History   Problem Relation Age of Onset    Stroke Father         CVA    Heart Disease Father     Cancer Mother         Lung     ROS: The patient has no difficulty with chest pain or shortness of breath. No fever or chills. Comprehensive review of systems was otherwise unremarkable except as noted above. Physical Exam:   Visit Vitals  /81   Pulse 78   Temp 98.4 °F (36.9 °C)   Resp 16   Ht 6' 1\" (1.854 m)   Wt 177 lb (80.3 kg)   SpO2 95%   BMI 23.35 kg/m²     Vitals:    06/17/19 1553 06/17/19 1943 06/17/19 2243 06/18/19 0302   BP: 136/88 130/80 149/74 127/81   Pulse: 69 88 72 78   Resp: 17 16 16 16   Temp: 99 °F (37.2 °C) 98.4 °F (36.9 °C) 97.9 °F (36.6 °C) 98.4 °F (36.9 °C)   SpO2: 95% 94% 94% 95%   Weight:       Height:         06/17 1901 - 06/18 0700  In: 1000 [I.V.:1000]  Out: 800 [Urine:150]  06/16 0701 - 06/17 1900  In: 3888 [I.V.:2755]  Out: 5731 [Urine:1625]    Constitutional: Alert, oriented, cooperative patient in no acute distress; appears stated age    Eyes:Sclera are clear. EOMs intact  ENMT: no external lesions gross hearing normal; no obvious neck masses, no ear or lip lesions, nares normal; +NGT  CV: RRR. Normal perfusion  Resp: No JVD. Breathing is  non-labored; no audible wheezing.     GI: softer, no palp mass; minimal lower abd tenderness; no peritoneal signs     Musculoskeletal: unremarkable with normal function. No embolic signs or cyanosis. Neuro:  Oriented; moves all 4; no focal deficits  Psychiatric: normal affect and mood, no memory impairment    Recent vitals (if inpt):  Patient Vitals for the past 24 hrs:   BP Temp Pulse Resp SpO2   06/18/19 0302 127/81 98.4 °F (36.9 °C) 78 16 95 %   06/17/19 2243 149/74 97.9 °F (36.6 °C) 72 16 94 %   06/17/19 1943 130/80 98.4 °F (36.9 °C) 88 16 94 %   06/17/19 1553 136/88 99 °F (37.2 °C) 69 17 95 %   06/17/19 1103 139/85 98.1 °F (36.7 °C) 71 18 96 %   06/17/19 0712 128/77 99.4 °F (37.4 °C) 62 18 94 %       Labs:  Recent Labs     06/18/19  0511   WBC 12.4*   HGB 15.6         K 3.9      CO2 31   BUN 19   CREA 0.98   *       Lab Results   Component Value Date/Time    WBC 12.4 (H) 06/18/2019 05:11 AM    HGB 15.6 06/18/2019 05:11 AM    PLATELET 162 60/99/4602 05:11 AM    Sodium 139 06/18/2019 05:11 AM    Potassium 3.9 06/18/2019 05:11 AM    Chloride 101 06/18/2019 05:11 AM    CO2 31 06/18/2019 05:11 AM    BUN 19 06/18/2019 05:11 AM    Creatinine 0.98 06/18/2019 05:11 AM    Glucose 102 (H) 06/18/2019 05:11 AM    Bilirubin, total 0.4 06/13/2019 08:16 PM    AST (SGOT) 13 (L) 06/13/2019 08:16 PM    ALT (SGPT) 23 06/13/2019 08:16 PM    Alk. phosphatase 85 06/13/2019 08:16 PM       CT Results  (Last 48 hours)    None        chest X-ray      I reviewed recent labs, recent radiologic studies, and pertinent records including other doctor notes if needed. I independently reviewed radiology images for studies I described above or studies I have ordered.    Admission date (for inpatients): 6/13/2019   * No surgery found *  * No surgery found *    ASSESSMENT/PLAN:  Problem List  Date Reviewed: 6/13/2019          Codes Class Noted    3.2cm AAA on CT 6/13/19 ICD-10-CM: I71.4  ICD-9-CM: 441.4  6/14/2019        Hiatal hernia ICD-10-CM: K44.9  ICD-9-CM: 553.3  6/14/2019        Generalized abdominal pain ICD-10-CM: R10.84  ICD-9-CM: 789.07  6/13/2019        * (Principal) Small Bowel Enteritis on CT ICD-10-CM: K52.9  ICD-9-CM: 558.9  6/13/2019        SBO (small bowel obstruction) ([facility] ICD-10-CM: X48.353  ICD-9-CM: 560.9  6/13/2019        Leukocytosis ICD-10-CM: X29.153  ICD-9-CM: 288.60  6/13/2019        Tobacco use disorder ICD-10-CM: F17.200  ICD-9-CM: 305.1  5/26/2015        Esophageal reflux ICD-10-CM: K21.9  ICD-9-CM: 530.81  5/26/2015        Chronic airway obstruction, not elsewhere classified ICD-10-CM: J44.9  ICD-9-CM: 443  5/26/2015            Principal Problem:    Small Bowel Enteritis on CT (6/13/2019)    Active Problems:    Tobacco use disorder (5/26/2015)      Generalized abdominal pain (6/13/2019)      SBO (small bowel obstruction) ([facility] (6/13/2019)      Leukocytosis (6/13/2019)      3.2cm AAA on CT 6/13/19 (6/14/2019)      Hiatal hernia (6/14/2019)         Small Bowel Enteritis with secondary SBO  I ordered repeat CT today without contrast (to avoid SB distention should SBO be unresolved)  DDx Infectious, Inflammatory, Ischemic   Passing flatus each day    NPO/NGT/IVF/Bowel rest  IV Flagyl/Cipro   Stool cultures if any diarrhea  GI will likely perform colonoscopy as outpt after acute illness resolves and follow for possible Crohn's      Abd pain  Prn pain meds    Leukocytosis  Improving each day  WBC improved 12.6 (6/16/19)-->11.4 (6/17/19)  Follow    3.2cm AAA on CT 6/13/19  Refer to vascular surgery as outpt for follow-up    Hiatal hernia on CT 6/13/19  Asymptomatic, no surgery planned        I have personally performed a face-to-face diagnostic evaluation and management  service on this patient. I have independently seen the patient. I have independently obtained the above history from the patient/family. I have independently examined the patient with above findings. I have independently reviewed data/labs for this patient and developed the above plan of care (MDM). Signed: Olya Callahan.  Kathe Long MD, FACS

## 2019-06-18 NOTE — PERIOP NOTES
TRANSFER - IN REPORT:    Verbal report received from Teresa Park RN (name) on Jacinto Blood  being received from 2nd floor (unit) for ordered procedure      Report consisted of patients Situation, Background, Assessment and   Recommendations(SBAR). Information from the following report(s) SBAR and MAR was reviewed with the receiving nurse. Opportunity for questions and clarification was provided. Assessment completed upon patients arrival to unit and care assumed.

## 2019-06-18 NOTE — ANESTHESIA PREPROCEDURE EVALUATION
Relevant Problems   No relevant active problems       Anesthetic History               Review of Systems / Medical History      Pulmonary    COPD: moderate      Smoker  Asthma        Neuro/Psych   Within defined limits           Cardiovascular                  Exercise tolerance: >4 METS     GI/Hepatic/Renal     GERD          Comments: SBO Endo/Other  Within defined limits           Other Findings              Physical Exam    Airway  Mallampati: II  TM Distance: 4 - 6 cm  Neck ROM: normal range of motion   Mouth opening: Normal     Cardiovascular    Rhythm: regular           Dental  No notable dental hx       Pulmonary                 Abdominal         Other Findings            Anesthetic Plan    ASA: 3, emergent              Anesthetic plan and risks discussed with: Patient and Spouse

## 2019-06-18 NOTE — PROGRESS NOTES
Patient UOP is 150cc this shift. Per  order administering 1L bolus of LR over 2 hours d/t UOP >35cc/hr.

## 2019-06-18 NOTE — PROGRESS NOTES
Dispo update:  Spoke to Mr. Sanchezyonatanfrancheska Stewart and his fiancee in room 201 yesterday about discharge planning. Plan is home to [place], [place] when medically stable. No additional discharge needs anticipated.

## 2019-06-18 NOTE — PROGRESS NOTES
H&P/Consult Note/Progress Note/Office Note:   Gladys Garcia  MRN: [id number]  :1955  Age:63 y.o.    HPI: Gladys Garcia is a 58 y.o. male who was admitted from the ER on 19 after presenting with   a 1 day h/o progressive, constant, severe, non-radiating, generalized abd pain. Nothing made pain better or worse. No associated N/V/F/C or diarrhea  Normally has 1-2 solid BMs qday but lately BM only every other day  No h/o bloody diarrhea or sign diarrhea issues    WBC 12.7k  Surgery consulted after CT showed acute enteritis with component of underlying obstrauction  No h/o Crohn's or lymphoma  He is s/p appy in 1990s        19 CT abd/pelvis with PO and IV contrast  - Liver: Within normal limits. - Gallbladder and bile ducts: Within normal limits. - Spleen: Within normal limits. - Urinary tract: Within normal limits. - Adrenals: Within normal limits. - Pancreas: Within normal limits. - Gastrointestinal tract: There is an approximate 15 cm long segment of small  bowel wall thickening in the pelvis, in the region of the distal ileum, with  mesenteric hyperemia and trace free pelvic fluid. Findings are consistent with  acute enteritis. The more proximal small bowel loops are mildly distended,  suggesting a component of underlying obstruction as well. The terminal ileum has  a normal appearance. The colon is unremarkable. The appendix is not definitely  identified. No focal abscess or free air is identified.  - Retroperitoneum: There is a 3.2 cm fusiform infrarenal abdominal aortic  aneurysm, without evidence of dissection or rupture. - Peritoneal cavity and abdominal wall: Within normal limits. - Pelvis: Within normal limits. - Spine/bones: No acute process. - Other comments: The lung bases are clear. There is a small hiatal hernia. IMPRESSION:   1. Acute enteritis involving an approximately 15 cm long segment of pelvic small  bowel, with an associated mild small bowel obstruction. There is trace free  fluid in the pelvis as well.  2. 3.2 cm infrarenal abdominal aortic aneurysm. 3. Small hiatal hernia        6/18/19 CT abd/pevls without oral or IV contrast    ABD:  New small right pleural effusion with associated mild dependent subsegmental  atelectasis bilateral lung bases. Normal-appearing liver, gallbladder, spleen,  pancreas, bilateral adrenal glands and kidneys. Moderate calcific  atherosclerosis of a borderline aneurysmal infrarenal abdominal aorta measuring  up to 2.6 x 3.0 cm, unchanged. No evidence of significant lymphadenopathy. New  esophageal tube tip terminating in the gastric body. Interval increase in  diffuse fluid-filled dilatation of the proximal small bowel which is not well  evaluated on this noncontrast examination, however there appears to be a sharp  transition point in the left pelvis into decompressed distal small bowel loops,  this finding likely represents worsening mechanical small bowel obstruction  secondary to adhesions or bands. No evidence of intraperitoneal free air.     PELVIS:  Normal-appearing urinary bladder. Punctate prostatic calcifications. Normal-appearing seminal vesicles. Sigmoid diverticula. Normal-appearing  proximal colon. Increased trace pelvic free fluid. No evidence of significant  inguinal or pelvic sidewall lymphadenopathy. Chronic healed fracture  deformities of the right superior pubic ramus and inferior pubic ramus appears  similar to prior. Visualized osseous structures otherwise unremarkable.     IMPRESSION:      1. Apparent interval worsening of mechanical small bowel obstruction with sharp  transition point in the left pelvis, likely secondary to adhesions or bands. 2.  Increased trace pelvic ascites, with a new small right pleural effusion.   3.  Other chronic findings as above.                    Additional hx:  6/15/19 passing flatus; pain better  6/16/19 passing more flatus, no bm yet; some lower abd pain after getting up to BR earlier  6/17/19 passing more flatus; no BM yet; AF; mild lower abd pain  6/18/19 passing flatus; 1.2-1.8 Liters NGT dark output qday;  Mild lower abd pain; repeat CT is worse as above. Surgery recommended        Past Medical History:   Diagnosis Date    Arteritis, unspecified ([facility] [address]     Asthma     Body mass index between 19-24, adult     Bronchiolitis     CA in situ skin     Chronic airway obstruction, not elsewhere classified ([facility] 5/26/2015    Coccyx disorder     Colon polyps     Crushing injury     Dermatitis     dermatitis/urticaria    ED (erectile dysfunction)     Esophageal reflux 5/26/2015    Herpes zoster     Impotence of organic origin     Lumbar back pain     Pain in limb     Reflux esophagitis     Skin disorder     Tobacco use disorder 5/26/2015     Past Surgical History:   Procedure Laterality Date    HX HEART CATHETERIZATION  1999    HX HEMORRHOIDECTOMY  1999     Current Facility-Administered Medications   Medication Dose Route Frequency    pantoprazole (PROTONIX) 40 mg in sodium chloride 0.9% 10 mL injection  40 mg IntraVENous Q12H    lactated Ringers infusion  200 mL/hr IntraVENous CONTINUOUS    enoxaparin (LOVENOX) injection 40 mg  40 mg SubCUTAneous Q24H    acetaminophen (TYLENOL) tablet 1,000 mg  1,000 mg Oral Q6H PRN    ondansetron (ZOFRAN) injection 4 mg  4 mg IntraVENous Q4H PRN    morphine 10 mg/ml injection 2-4 mg  2-4 mg IntraVENous Q1H PRN    phenol throat spray (CHLORASEPTIC) 1 Spray  1 Spray Oral PRN    ciprofloxacin (CIPRO) 400 mg in D5W IVPB (premix)  400 mg IntraVENous Q12H    metroNIDAZOLE (FLAGYL) IVPB premix 500 mg  500 mg IntraVENous Q6H     Patient has no known allergies.   Social History     Socioeconomic History    Marital status:      Spouse name: Not on file    Number of children: Not on file    Years of education: Not on file    Highest education level: Not on file   Tobacco Use    Smoking status: Current Every Day Smoker     Packs/day: 1.00     Years: 42.00     Pack years: 42.00    Smokeless tobacco: Former User     Quit date: 1/1/1991   Substance and Sexual Activity    Alcohol use: No    Drug use: No     Social History     Tobacco Use   Smoking Status Current Every Day Smoker    Packs/day: 1.00    Years: 42.00    Pack years: 42.00   Smokeless Tobacco Former User    Quit date: 1/1/1991     Family History   Problem Relation Age of Onset    Stroke Father         CVA    Heart Disease Father     Cancer Mother         Lung     ROS: The patient has no difficulty with chest pain or shortness of breath. No fever or chills. Comprehensive review of systems was otherwise unremarkable except as noted above. Physical Exam:   Visit Vitals  /83 (BP 1 Location: Left arm, BP Patient Position: At rest)   Pulse 84   Temp 97.7 °F (36.5 °C)   Resp 20   Ht 6' 1\" (1.854 m)   Wt 177 lb (80.3 kg)   SpO2 94%   BMI 23.35 kg/m²     Vitals:    06/18/19 0302 06/18/19 0732 06/18/19 1153 06/18/19 1553   BP: 127/81 146/84 129/82 116/83   Pulse: 78 78 71 84   Resp: 16 16 16 20   Temp: 98.4 °F (36.9 °C) 98.6 °F (37 °C) 98.9 °F (37.2 °C) 97.7 °F (36.5 °C)   SpO2: 95% 95% 93% 94%   Weight:       Height:         06/18 0701 - 06/18 1900  In: -   Out: 700 [Urine:200]  06/16 1901 - 06/18 0700  In: 1910 [I.V.:1910]  Out: 4400 [Urine:1200]    Constitutional: Alert, oriented, cooperative patient in no acute distress; appears stated age    Eyes:Sclera are clear. EOMs intact  ENMT: no external lesions gross hearing normal; no obvious neck masses, no ear or lip lesions, nares normal; +NGT  CV: RRR. Normal perfusion  Resp: No JVD. Breathing is  non-labored; no audible wheezing. GI: softer, no palp mass; minimal lower abd tenderness; no peritoneal signs     Musculoskeletal: unremarkable with normal function. No embolic signs or cyanosis.    Neuro:  Oriented; moves all 4; no focal deficits  Psychiatric: normal affect and mood, no memory impairment    Recent vitals (if inpt):  Patient Vitals for the past 24 hrs:   BP Temp Pulse Resp SpO2   06/18/19 1553 116/83 97.7 °F (36.5 °C) 84 20 94 %   06/18/19 1153 129/82 98.9 °F (37.2 °C) 71 16 93 %   06/18/19 0732 146/84 98.6 °F (37 °C) 78 16 95 %   06/18/19 0302 127/81 98.4 °F (36.9 °C) 78 16 95 %   06/17/19 2243 149/74 97.9 °F (36.6 °C) 72 16 94 %   06/17/19 1943 130/80 98.4 °F (36.9 °C) 88 16 94 %       Labs:  Recent Labs     06/18/19  0511   WBC 12.4*   HGB 15.6         K 3.9      CO2 31   BUN 19   CREA 0.98   *       Lab Results   Component Value Date/Time    WBC 12.4 (H) 06/18/2019 05:11 AM    HGB 15.6 06/18/2019 05:11 AM    PLATELET 911 89/93/7545 05:11 AM    Sodium 139 06/18/2019 05:11 AM    Potassium 3.9 06/18/2019 05:11 AM    Chloride 101 06/18/2019 05:11 AM    CO2 31 06/18/2019 05:11 AM    BUN 19 06/18/2019 05:11 AM    Creatinine 0.98 06/18/2019 05:11 AM    Glucose 102 (H) 06/18/2019 05:11 AM    Bilirubin, total 0.4 06/13/2019 08:16 PM    AST (SGOT) 13 (L) 06/13/2019 08:16 PM    ALT (SGPT) 23 06/13/2019 08:16 PM    Alk. phosphatase 85 06/13/2019 08:16 PM       CT Results  (Last 48 hours)               06/18/19 0659  CT ABD PELV WO CONT Final result    Impression:  IMPRESSION:        1. Apparent interval worsening of mechanical small bowel obstruction with sharp   transition point in the left pelvis, likely secondary to adhesions or bands. 2.  Increased trace pelvic ascites, with a new small right pleural effusion. 3.  Other chronic findings as above. Narrative:  CT ABDOMEN AND PELVIS WITHOUT CONTRAST       HISTORY: NO oral and no IV contrast; Eval interval changes in SBO and enteritis,   63 years Male       COMPARISON: CT abdomen pelvis June 13, 2019       TECHNIQUE:  Noncontrast axial helical CT images were obtained from above the   diaphragm through the pelvis.  Coronal reformatted images were obtained at the   scanner console and made available for review. Radiation dose reduction techniques were used for this study:  Our CT scanners   use one or all of the following: Automated exposure control, adjustment of the   mA and/or kVp according to patient's size, iterative reconstruction. FINDINGS:       ABDOMEN:   New small right pleural effusion with associated mild dependent subsegmental   atelectasis bilateral lung bases. Normal-appearing liver, gallbladder, spleen,   pancreas, bilateral adrenal glands and kidneys. Moderate calcific   atherosclerosis of a borderline aneurysmal infrarenal abdominal aorta measuring   up to 2.6 x 3.0 cm, unchanged. No evidence of significant lymphadenopathy. New   esophageal tube tip terminating in the gastric body. Interval increase in   diffuse fluid-filled dilatation of the proximal small bowel which is not well   evaluated on this noncontrast examination, however there appears to be a sharp   transition point in the left pelvis into decompressed distal small bowel loops,   this finding likely represents worsening mechanical small bowel obstruction   secondary to adhesions or bands. No evidence of intraperitoneal free air. PELVIS:   Normal-appearing urinary bladder. Punctate prostatic calcifications. Normal-appearing seminal vesicles. Sigmoid diverticula. Normal-appearing   proximal colon. Increased trace pelvic free fluid. No evidence of significant   inguinal or pelvic sidewall lymphadenopathy. Chronic healed fracture   deformities of the right superior pubic ramus and inferior pubic ramus appears   similar to prior. Visualized osseous structures otherwise unremarkable. chest X-ray      I reviewed recent labs, recent radiologic studies, and pertinent records including other doctor notes if needed. I independently reviewed radiology images for studies I described above or studies I have ordered.    Admission date (for inpatients): 6/13/2019   * No surgery found * Procedure(s):  LAPAROTOMY EXPLORATORY WITH POSSIBLE BOWEL RESECTION    ASSESSMENT/PLAN:  Problem List  Date Reviewed: 6/13/2019          Codes Class Noted    Oliguria ICD-10-CM: R34  ICD-9-CM: 788.5  6/18/2019        3.2cm AAA on CT 6/13/19 ICD-10-CM: I71.4  ICD-9-CM: 441.4  6/14/2019        Hiatal hernia ICD-10-CM: K44.9  ICD-9-CM: 553.3  6/14/2019        Generalized abdominal pain ICD-10-CM: R10.84  ICD-9-CM: 789.07  6/13/2019        * (Principal) Small Bowel Enteritis on CT ICD-10-CM: K52.9  ICD-9-CM: 558.9  6/13/2019        SBO (small bowel obstruction) ([facility] ICD-10-CM: P51.326  ICD-9-CM: 560.9  6/13/2019        Leukocytosis ICD-10-CM: U87.330  ICD-9-CM: 288.60  6/13/2019        Tobacco use disorder ICD-10-CM: F17.200  ICD-9-CM: 305.1  5/26/2015        Esophageal reflux ICD-10-CM: K21.9  ICD-9-CM: 530.81  5/26/2015        Chronic airway obstruction, not elsewhere classified ICD-10-CM: J44.9  ICD-9-CM: 384  5/26/2015            Principal Problem:    Small Bowel Enteritis on CT (6/13/2019)    Active Problems:    Tobacco use disorder (5/26/2015)      Generalized abdominal pain (6/13/2019)      SBO (small bowel obstruction) ([facility] (6/13/2019)      Leukocytosis (6/13/2019)      3.2cm AAA on CT 6/13/19 (6/14/2019)      Hiatal hernia (6/14/2019)      Oliguria (6/18/2019)         Small Bowel Enteritis with secondary SBO  Repeat Ct scan reviewed with patient and family. I offered and recommended surgery. We discussed risks of continued medical management vs risks of surgery. I told him given the CT images and lack of improvement despite our best efforts to decompress him with bowel rest and NGT, the obstructive process has failed to resolve. I discussed the patient's condition with the patient at length and treatment options.   I discussed risks of surgery (expl lap with possible bowel resection) in language the patient could understand including bleeding, infection, need for further surgery, abscess, fistula, SBO, DVT, PE, heart attack, stroke, pneumonia, and death. The patient voiced understanding of all this and all questions were answered. Alternatives to surgery were discussed also and risks of the alternatives. The patient requested to think about it. All questions answered. After deliberation and after all his questions were answered, he now consents to surgery. OR getting ready       NPO/NGT/IVF/Bowel rest  IV Flagyl/Cipro   Stool cultures if any diarrhea  GI will likely perform colonoscopy as outpt after acute illness resolves and follow for possible Crohn's      Abd pain  Prn pain meds    Leukocytosis  Worse this am    3.2cm AAA on CT 6/13/19  Refer to vascular surgery as outpt for follow-up    Hiatal hernia on CT 6/13/19  Asymptomatic, no surgery planned    Oliguria (new problem on 6/18/19)  Likely related to persistent SBO and high NGT output  Bolus given  LR at 200cc/hr    E/M service time today counselling and coordination of care 1hr 15min       I have personally performed a face-to-face diagnostic evaluation and management  service on this patient. I have independently seen the patient. I have independently obtained the above history from the patient/family. I have independently examined the patient with above findings. I have independently reviewed data/labs for this patient and developed the above plan of care (MDM). Signed: Fredrick Zepeda.  Evaristo Abreu MD, FACS

## 2019-06-18 NOTE — PROGRESS NOTES
TRANSFER - OUT REPORT:    Verbal report given to Jossue Melo RN(name) on Klaus Marcos  being transferred to Preop(unit) for ordered procedure       Report consisted of patients Situation, Background, Assessment and   Recommendations(SBAR). Information from the following report(s) SBAR, Kardex, Intake/Output, MAR, Recent Results and Med Rec Status was reviewed with the receiving nurse. Lines:   Peripheral IV 06/16/19 Right Forearm (Active)   Site Assessment Clean, dry, & intact 6/18/2019  1:15 PM   Phlebitis Assessment 0 6/18/2019  1:15 PM   Infiltration Assessment 0 6/18/2019  1:15 PM   Dressing Status Clean, dry, & intact 6/18/2019  1:15 PM   Dressing Type Tape;Transparent 6/18/2019  1:15 PM   Hub Color/Line Status Infusing 6/18/2019  1:15 PM       Peripheral IV 06/18/19 Right Forearm (Active)   Site Assessment Clean, dry, & intact 6/18/2019  3:29 PM   Phlebitis Assessment 0 6/18/2019  3:29 PM   Infiltration Assessment 0 6/18/2019  3:29 PM   Dressing Status Clean, dry, & intact 6/18/2019  3:29 PM   Dressing Type Tape;Transparent 6/18/2019  3:29 PM   Hub Color/Line Status Capped 6/18/2019  3:29 PM        Opportunity for questions and clarification was provided.

## 2019-06-19 LAB
ALBUMIN SERPL-MCNC: 2.8 G/DL (ref 3.2–4.6)
ALBUMIN/GLOB SERPL: 1.1 {RATIO} (ref 1.2–3.5)
ALP SERPL-CCNC: 69 U/L (ref 50–136)
ALT SERPL-CCNC: 29 U/L (ref 12–65)
ANION GAP SERPL CALC-SCNC: 8 MMOL/L (ref 7–16)
AST SERPL-CCNC: 41 U/L (ref 15–37)
BILIRUB SERPL-MCNC: 0.7 MG/DL (ref 0.2–1.1)
BUN SERPL-MCNC: 24 MG/DL (ref 8–23)
CALCIUM SERPL-MCNC: 8.1 MG/DL (ref 8.3–10.4)
CHLORIDE SERPL-SCNC: 103 MMOL/L (ref 98–107)
CO2 SERPL-SCNC: 29 MMOL/L (ref 21–32)
CREAT SERPL-MCNC: 0.93 MG/DL (ref 0.8–1.5)
ERYTHROCYTE [DISTWIDTH] IN BLOOD BY AUTOMATED COUNT: 12.8 % (ref 11.9–14.6)
GLOBULIN SER CALC-MCNC: 2.5 G/DL (ref 2.3–3.5)
GLUCOSE SERPL-MCNC: 130 MG/DL (ref 65–100)
HCT VFR BLD AUTO: 40.4 % (ref 41.1–50.3)
HGB BLD-MCNC: 13 G/DL (ref 13.6–17.2)
MCH RBC QN AUTO: 30.4 PG (ref 26.1–32.9)
MCHC RBC AUTO-ENTMCNC: 32.2 G/DL (ref 31.4–35)
MCV RBC AUTO: 94.6 FL (ref 79.6–97.8)
NRBC # BLD: 0 K/UL (ref 0–0.2)
PLATELET # BLD AUTO: 234 K/UL (ref 150–450)
PMV BLD AUTO: 11 FL (ref 9.4–12.3)
POTASSIUM SERPL-SCNC: 4.3 MMOL/L (ref 3.5–5.1)
PROT SERPL-MCNC: 5.3 G/DL (ref 6.3–8.2)
RBC # BLD AUTO: 4.27 M/UL (ref 4.23–5.6)
SODIUM SERPL-SCNC: 140 MMOL/L (ref 136–145)
WBC # BLD AUTO: 11.6 K/UL (ref 4.3–11.1)

## 2019-06-19 PROCEDURE — 85027 COMPLETE CBC AUTOMATED: CPT

## 2019-06-19 PROCEDURE — 74011250636 HC RX REV CODE- 250/636: Performed by: SURGERY

## 2019-06-19 PROCEDURE — C9113 INJ PANTOPRAZOLE SODIUM, VIA: HCPCS | Performed by: SURGERY

## 2019-06-19 PROCEDURE — 65270000029 HC RM PRIVATE

## 2019-06-19 PROCEDURE — 74011000250 HC RX REV CODE- 250: Performed by: SURGERY

## 2019-06-19 PROCEDURE — 77010033678 HC OXYGEN DAILY

## 2019-06-19 PROCEDURE — 77030011256 HC DRSG MEPILEX <16IN NO BORD MOLN -A

## 2019-06-19 PROCEDURE — 36415 COLL VENOUS BLD VENIPUNCTURE: CPT

## 2019-06-19 PROCEDURE — 80053 COMPREHEN METABOLIC PANEL: CPT

## 2019-06-19 PROCEDURE — 77030020255 HC SOL INJ LR 1000ML BG

## 2019-06-19 RX ADMIN — SODIUM CHLORIDE 40 MG: 9 INJECTION, SOLUTION INTRAMUSCULAR; INTRAVENOUS; SUBCUTANEOUS at 09:13

## 2019-06-19 RX ADMIN — MORPHINE SULFATE 2 MG: 10 INJECTION, SOLUTION INTRAMUSCULAR; INTRAVENOUS at 23:47

## 2019-06-19 RX ADMIN — CIPROFLOXACIN 400 MG: 2 INJECTION, SOLUTION INTRAVENOUS at 05:30

## 2019-06-19 RX ADMIN — SODIUM CHLORIDE, SODIUM LACTATE, POTASSIUM CHLORIDE, AND CALCIUM CHLORIDE 200 ML/HR: 600; 310; 30; 20 INJECTION, SOLUTION INTRAVENOUS at 15:51

## 2019-06-19 RX ADMIN — MORPHINE SULFATE 4 MG: 10 INJECTION, SOLUTION INTRAMUSCULAR; INTRAVENOUS at 15:53

## 2019-06-19 RX ADMIN — CIPROFLOXACIN 400 MG: 2 INJECTION, SOLUTION INTRAVENOUS at 19:11

## 2019-06-19 RX ADMIN — METRONIDAZOLE 500 MG: 500 INJECTION, SOLUTION INTRAVENOUS at 18:08

## 2019-06-19 RX ADMIN — METRONIDAZOLE 500 MG: 500 INJECTION, SOLUTION INTRAVENOUS at 11:33

## 2019-06-19 RX ADMIN — SODIUM CHLORIDE 40 MG: 9 INJECTION, SOLUTION INTRAMUSCULAR; INTRAVENOUS; SUBCUTANEOUS at 20:29

## 2019-06-19 RX ADMIN — MORPHINE SULFATE 4 MG: 10 INJECTION, SOLUTION INTRAMUSCULAR; INTRAVENOUS at 18:13

## 2019-06-19 RX ADMIN — METRONIDAZOLE 500 MG: 500 INJECTION, SOLUTION INTRAVENOUS at 02:13

## 2019-06-19 RX ADMIN — SODIUM CHLORIDE, SODIUM LACTATE, POTASSIUM CHLORIDE, AND CALCIUM CHLORIDE 200 ML/HR: 600; 310; 30; 20 INJECTION, SOLUTION INTRAVENOUS at 23:07

## 2019-06-19 RX ADMIN — MORPHINE SULFATE 2 MG: 10 INJECTION, SOLUTION INTRAMUSCULAR; INTRAVENOUS at 20:27

## 2019-06-19 RX ADMIN — MORPHINE SULFATE 4 MG: 10 INJECTION, SOLUTION INTRAMUSCULAR; INTRAVENOUS at 09:12

## 2019-06-19 RX ADMIN — ENOXAPARIN SODIUM 40 MG: 40 INJECTION SUBCUTANEOUS at 09:12

## 2019-06-19 RX ADMIN — SODIUM CHLORIDE, SODIUM LACTATE, POTASSIUM CHLORIDE, AND CALCIUM CHLORIDE 200 ML/HR: 600; 310; 30; 20 INJECTION, SOLUTION INTRAVENOUS at 05:31

## 2019-06-19 RX ADMIN — METRONIDAZOLE 500 MG: 500 INJECTION, SOLUTION INTRAVENOUS at 23:07

## 2019-06-19 NOTE — OP NOTES
[facility]  OPERATIVE REPORT    Name:  Jared Arguelles  MR#:  [id number]  :  1955  ACCOUNT #:  [de-identified]  DATE OF SERVICE:  2019    PREOPERATIVE DIAGNOSES:  1. Refractory small bowel obstruction. 2.  Leukocytosis. 3.  Abdominal pain. 4.  Short segment enteritis noted on initial CT imaging. POSTOPERATIVE DIAGNOSES:  1. Refractory small bowel obstruction. 2.  Leukocytosis. 3.  Abdominal pain. 4.  Short segment enteritis noted on initial CT imaging. 5. Internal hernia. 6.  Mesenteric mass. PROCEDURE PERFORMED:  1. Exploratory laparotomy with reduction of closed loop internal hernia. ( 3797)  2. Excision of mesenteric mass from a separate site. (CPT 85656-85)    SURGEON:  Jonna Angulo MD    ASSISTANT:  None. ANESTHESIA:  General.    COMPLICATIONS:  None. SPECIMENS REMOVED:  Mesenteric mass sent to Pathology for review. IMPLANTS:  None. ESTIMATED BLOOD LOSS:  Less than 40 mL. PROCEDURE:  The patient was taken to the operating room, placed in supine position. After adequate anesthesia was given, his abdomen was prepped and draped in sterile fashion with multiple layers of Betadine scrub and Betadine solution. Time-out protocol was followed. He was given prophylactic antibiotics. He was opened through a midline incision. The fascia was opened. There was some clear ascites. This was aspirated. The small bowel was inspected and run from the ligament of Treitz all the way to the cecum with the finding of a closed loop obstruction in the proximal ileum. This was a short segment of small bowel which was twisted and tangled in internal hernia. The internal hernia was freed. It consisted of an adhesion in the pelvis. This reduced the internal hernia and allowed us to inspect the small bowel better. The small bowel was significantly injured, but not irreversibly ischemic. It was not resected.   It was placed in its normal anatomic position. The cecum, transverse colon, descending colon, and sigmoid colon as well as proximal rectum were normal.  The anterior portion of the stomach was normal.  The NG tube was repositioned more distally. The liver was smooth. There were no peritoneal nodules. We ran the small bowel once again and milked distended succus which was causing small bowel distention above the closed loop obstruction. We milked this distally through the segment of small bowel which was involved with a closed loop obstruction and into the cecum. This was done to decompress the proximal small bowel and minimize distention and ischemia postoperatively. We brought up the Doppler into the field and could Doppler mesenteric pulses. The obstructed section of bowel was inspected several times during surgery and seemed to improve in its appearance each time. We ran the small bowel once again from the ligament of Treitz to the cecum and palpated the mesentery for any nodes suspicious for lymphoma. We encountered a distinctly separate, nodular, mesenteric mass at a completely separate site from the obstruction in the more proximal small bowel mesentery. It was firm and abnormal.  It was excised and sent to Pathology for review. It was not involved in the acute obstructive process and was unrelated. After adequate irrigation and confirmation of hemostasis, we placed the small bowel in its normal anatomic position and closed the incision by securing the fascia with a running looped #1 PDS suture. Subcutaneous adipose was irrigated and the skin was closed with clips. The patient was taken to the Recovery in good condition. He tolerated the procedure well.       Itz Iglesias MD [id number]  D:  06/18/2019 19:15  T:  06/18/2019 19:20  JOB #:  [id number]

## 2019-06-19 NOTE — ANESTHESIA PROCEDURE NOTES
Peripheral Block    Start time: 6/18/2019 6:35 PM  End time: 6/18/2019 6:38 PM  Performed by: Nicole Hoffmann MD  Authorized by: Nicole Hoffmann MD       Pre-procedure: Indications: post-op pain management    Preanesthetic Checklist: patient identified, risks and benefits discussed and patient being monitored    Timeout Time: 18:35          Block Type:   Block Type:  Rectus sheath (Rectus Sheath)  Laterality:  Left  Monitoring:  Standard ASA monitoring, responsive to questions, oxygen, continuous pulse ox, frequent vital sign checks and heart rate  Injection Technique:  Single shot  Procedures: ultrasound guided    Patient Position: supine  Prep: chlorhexidine    Location:  Abdominal  Needle Type:  Stimuplex  Needle Gauge:  20 G  Needle Localization:  Ultrasound guidance    Assessment:  Number of attempts:  1  Injection Assessment:  Incremental injection every 5 mL, no paresthesia, ultrasound image on chart, no intravascular symptoms, negative aspiration for blood and local visualized surrounding nerve on ultrasound  Patient tolerance:  Patient tolerated the procedure well with no immediate complications  Performed following surgery but prior to emergence from anesthesia.

## 2019-06-19 NOTE — ANESTHESIA PROCEDURE NOTES
Peripheral Block    Start time: 6/18/2019 6:39 PM  End time: 6/18/2019 6:42 PM  Performed by: Poncho Burnett MD  Authorized by: Poncho Burnett MD       Pre-procedure: Indications: post-op pain management    Preanesthetic Checklist: patient identified, risks and benefits discussed, site marked, timeout performed, anesthesia consent given and patient being monitored    Timeout Time: 18:39          Block Type:   Block Type:  Rectus sheath (Rectus Sheath)  Laterality:  Right  Monitoring:  Standard ASA monitoring, responsive to questions, oxygen, continuous pulse ox, frequent vital sign checks and heart rate  Injection Technique:  Single shot  Procedures: ultrasound guided    Patient Position: supine  Prep: chlorhexidine    Location:  Abdominal  Needle Type:  Stimuplex  Needle Gauge:  20 G  Needle Localization:  Ultrasound guidance    Assessment:  Number of attempts:  1  Injection Assessment:  Incremental injection every 5 mL, no paresthesia, ultrasound image on chart, no intravascular symptoms, negative aspiration for blood and local visualized surrounding nerve on ultrasound  Patient tolerance:  Patient tolerated the procedure well with no immediate complications  Performed prior to emergence from anesthesia but following incision closure.

## 2019-06-19 NOTE — PROGRESS NOTES
Nutrition  Reason for assessment: Day 5 NPO  Assessment:  Food/Nutrition Patient History: S/P ex lap for refractory SBO and reduction of closed loop obstruction from internal hernia and excision of mesenteric mass on 6/18/19. NGT to LIS with 1800 cc HAWKINS x 24 hours. Abdomen distended, not passing flatus, with hypoactive bowel sounds. Malnutrition screening tool at admission was negative for weight loss and/or decreased appetite. Anthropometrics:Height: 6' 1\" (185.4 cm),  Weight: 80.3 kg (177 lb),  , Body mass index is 23.35 kg/m². BMI class of normal weight. Macronutrient needs:  EER:  3435-2707 kcal /day (25-30 kcal/kg listed BW)  EPR:  80-96 grams protein/day (1-1.2 grams/kg listed BW)  Intake/Comparative Standards: NPO, meeting 0% of needs. Nutrition Diagnosis: Inadequate oral intake r/t altered GI function as evidenced by NPO x 5 days d/t SBO. Intervention:  Meals and snacks: NPO  If diet is not expected to advance within the next 2-4 days, recommend initiation of IV nutrition. Discharge nutrition plan: Too soon to determine.     [address], [facility], [address], [address]

## 2019-06-19 NOTE — ADT AUTH CERT NOTES
Care Day: 5 Care Date: 6/18/2019 Level of Care: Inpatient Floor Guideline Day 2 Clinical Status  
(X) * Hypotension absent 6/18/2019 16:10:38 EDT by Cely Ayala 129/82   
  
(X) * Nausea and vomiting absent or improved 6/18/2019 16:10:38 EDT by Cely Ayala No N/V/D   
  
(X) * Pain absent or managed 6/18/2019 16:10:38 EDT by Cely Ayala No pain   
  
(X) * Abdominal exam stable or improved Routes (X) IV fluids, medications 6/18/2019 16:10:38 EDT by Cely Ayala Meds: Cipro IV bid; Lovenox sc qd; Flagyl IV q6; LR @ 200; Protonix IV bid Interventions ( ) * NG tube absent   
(X) Possible follow-up abdominal imaging 6/18/2019 16:10:38 EDT by Cely Ayala Repeat CT today * Milestone Additional Notes 6/18/19:  
VS 98.9-71-/82-93% RA. Patient is passing flatus; 1.2-1.8 Liters NGT dark output qday. Has continue c/o mild lower abd pain. Remains NPO. Abd CT: 1. Apparent interval worsening of mechanical small bowel obstruction with sharp transition point in the left pelvis, likely secondary to adhesions or bands. 2. Increased trace pelvic ascites, with a new small right pleural effusion. Labs: WBC 12.4. Meds: Cipro IV bid; Lovenox sc qd; Flagyl IV q6; LR @ 200; Protonix IV bid  
  
   
Intestinal Obstruction - Care Day 4 (6/17/2019) by Elmira Montgomery  
 
   
Review Status Review Entered Completed 6/18/2019 16:10  
   
Criteria Review Care Day: 4 Care Date: 6/17/2019 Level of Care: Inpatient Floor Guideline Day 2 Clinical Status  
(X) * Hypotension absent 6/18/2019 16:10:38 EDT by Cely Ayala 128/77   
  
(X) * Nausea and vomiting absent or improved 6/18/2019 16:10:38 EDT by Cely Ayala No N/V/D   
  
(X) * Pain absent or managed 6/18/2019 16:10:38 EDT by Cely Ayala Pain improved   
  
(X) * Abdominal exam stable or improved 6/18/2019 16:10:38 EDT by Armando Matta Improved Routes (X) IV fluids, medications 6/18/2019 16:10:38 EDT by Armando Matta Meds: Cipro IV bid; Lovenox sc qd; Flagyl IV q6; D5 1/2 NS w/ 20 KCL @ 100ml/hr; Pepcid IV bid. Interventions ( ) * NG tube absent * Milestone Additional Notes 6/17/19:  
Continue Cipro and Flagyl IV (day # 4). Tentative CT abd/pelvis on 6/18 per surgery's note. NG tube to suction for bowel rest. Remains NPO. Plan outpatient colonoscopy for evaluation as long as clinical status continues to improve. Labs: WBC 11.4; Creat 0.79. VS 99.4-62-/77-94% RA. Meds: Cipro IV bid; Lovenox sc qd; Flagyl IV q6; D5 1/2 NS w/ 20 KCL @ 100ml/hr; Pepcid IV bid.   
  
   
Intestinal Obstruction - Care Day 3 (6/16/2019) by Honey Puffer  
 
   
Review Status Review Entered Completed 6/18/2019 16:10  
   
Criteria Review Care Day: 3 Care Date: 6/16/2019 Level of Care: Inpatient Floor Guideline Day 2 Clinical Status  
(X) * Hypotension absent 6/18/2019 16:10:38 EDT by Armando Matta 139/73   
  
(X) * Nausea and vomiting absent or improved 6/18/2019 16:10:38 EDT by Armando Matta No N/V/D   
  
(X) * Pain absent or managed 6/18/2019 16:10:38 EDT by Armando Matta Pain improved   
  
(X) * Abdominal exam stable or improved 6/18/2019 16:10:38 EDT by Armando Matta Improved pain Routes (X) IV fluids, medications 6/18/2019 16:10:38 EDT by Armando Matta Meds: Cipro IV bid; Lovenox sc qd; Flagyl IV q6; Morphine IV x2; D5 1/2 NS w/ 20 KCL @ 100ml/hr; Pepcid IV bid. Interventions ( ) * NG tube absent * Milestone Additional Notes 6/16/19:  
VS 66-21-/73-92% RA. Distal small bowel obstruction, currently improving. Continue bowel rest, IV fluid, NG tube decompression. Increased flatus yesterday.  Not ready for p.o. intake yet. Leukocytosis improved, hemoglobin remains normal.  He will likely require outpatient colonoscopy after current episode resolves. Planning repeat CT next day or 2 per surgery. Labs: WBC 12.6. Meds: Cipro IV bid; Lovenox sc qd; Flagyl IV q6; Morphine IV x2; D5 1/2 NS w/ 20 KCL @ 100ml/hr; Pepcid IV bid.

## 2019-06-19 NOTE — ANESTHESIA POSTPROCEDURE EVALUATION
Procedure(s):  LAPAROTOMY EXPLORATORY REDUCTION OF INTERNAL HERNIA / EXCISION OF MESENTERIC MASS. general    Anesthesia Post Evaluation      Multimodal analgesia: multimodal analgesia used between 6 hours prior to anesthesia start to PACU discharge  Patient location during evaluation: bedside  Patient participation: complete - patient participated  Level of consciousness: awake and responsive to light touch  Pain management: adequate  Airway patency: patent  Anesthetic complications: no  Cardiovascular status: acceptable, hemodynamically stable, blood pressure returned to baseline and stable  Respiratory status: acceptable, unassisted, spontaneous ventilation and nonlabored ventilation  Hydration status: acceptable  Post anesthesia nausea and vomiting:  controlled      Vitals Value Taken Time   /78 6/18/2019  7:56 PM   Temp 36.4 °C (97.5 °F) 6/18/2019  6:56 PM   Pulse 76 6/18/2019  7:56 PM   Resp 26 6/18/2019  7:55 PM   SpO2 95 % 6/18/2019  7:56 PM   Vitals shown include unvalidated device data.

## 2019-06-19 NOTE — PROGRESS NOTES
H&P/Consult Note/Progress Note/Office Note:   Luis Martell  MRN: [id number]  :1955  Age:63 y.o.    HPI: Luis Martell is a 58 y.o. male who is s/p expl lap for refractory SBO and reduction of closed loop obstruction from internal hernia and excision of mesenteric mass on 19. Prior to surgery he was admitted from the ER on 19 after presenting with   a 1 day h/o progressive, constant, severe, non-radiating, generalized abd pain. Nothing made pain better or worse. No associated N/V/F/C or diarrhea  Normally has 1-2 solid BMs qday but lately BM only every other day  No h/o bloody diarrhea or sign diarrhea issues    WBC 12.7k  Surgery consulted after CT showed acute enteritis with component of underlying obstrauction  No h/o Crohn's or lymphoma  He is s/p appy in 1990s        19 CT abd/pelvis with PO and IV contrast  - Liver: Within normal limits. - Gallbladder and bile ducts: Within normal limits. - Spleen: Within normal limits. - Urinary tract: Within normal limits. - Adrenals: Within normal limits. - Pancreas: Within normal limits. - Gastrointestinal tract: There is an approximate 15 cm long segment of small  bowel wall thickening in the pelvis, in the region of the distal ileum, with  mesenteric hyperemia and trace free pelvic fluid. Findings are consistent with  acute enteritis. The more proximal small bowel loops are mildly distended,  suggesting a component of underlying obstruction as well. The terminal ileum has  a normal appearance. The colon is unremarkable. The appendix is not definitely  identified. No focal abscess or free air is identified.  - Retroperitoneum: There is a 3.2 cm fusiform infrarenal abdominal aortic  aneurysm, without evidence of dissection or rupture. - Peritoneal cavity and abdominal wall: Within normal limits. - Pelvis: Within normal limits. - Spine/bones: No acute process. - Other comments: The lung bases are clear.  There is a small hiatal hernia. IMPRESSION:   1. Acute enteritis involving an approximately 15 cm long segment of pelvic small  bowel, with an associated mild small bowel obstruction. There is trace free  fluid in the pelvis as well.  2. 3.2 cm infrarenal abdominal aortic aneurysm. 3. Small hiatal hernia        6/18/19 CT abd/pevls without oral or IV contrast    ABD:  New small right pleural effusion with associated mild dependent subsegmental  atelectasis bilateral lung bases. Normal-appearing liver, gallbladder, spleen,  pancreas, bilateral adrenal glands and kidneys. Moderate calcific  atherosclerosis of a borderline aneurysmal infrarenal abdominal aorta measuring  up to 2.6 x 3.0 cm, unchanged. No evidence of significant lymphadenopathy. New  esophageal tube tip terminating in the gastric body. Interval increase in  diffuse fluid-filled dilatation of the proximal small bowel which is not well  evaluated on this noncontrast examination, however there appears to be a sharp  transition point in the left pelvis into decompressed distal small bowel loops,  this finding likely represents worsening mechanical small bowel obstruction  secondary to adhesions or bands. No evidence of intraperitoneal free air.     PELVIS:  Normal-appearing urinary bladder. Punctate prostatic calcifications. Normal-appearing seminal vesicles. Sigmoid diverticula. Normal-appearing  proximal colon. Increased trace pelvic free fluid. No evidence of significant  inguinal or pelvic sidewall lymphadenopathy. Chronic healed fracture  deformities of the right superior pubic ramus and inferior pubic ramus appears  similar to prior. Visualized osseous structures otherwise unremarkable.     IMPRESSION:      1. Apparent interval worsening of mechanical small bowel obstruction with sharp  transition point in the left pelvis, likely secondary to adhesions or bands. 2.  Increased trace pelvic ascites, with a new small right pleural effusion.   3. Other chronic findings as above.                    Additional hx:  6/15/19 passing flatus; pain better  6/16/19 passing more flatus, no bm yet; some lower abd pain after getting up to BR earlier  6/17/19 passing more flatus; no BM yet; AF; mild lower abd pain  6/18/19 passing flatus; 1.2-1.8 Liters NGT dark output qday;  Mild lower abd pain; repeat CT is worse as above. To OR   6/19/19 POD1; no complaints          Past Medical History:   Diagnosis Date    Arteritis, unspecified ([facility] [address]     Asthma     Body mass index between 19-24, adult     Bronchiolitis     CA in situ skin     Chronic airway obstruction, not elsewhere classified 5/26/2015    Coccyx disorder     Colon polyps     Crushing injury     Dermatitis     dermatitis/urticaria    ED (erectile dysfunction)     Esophageal reflux 5/26/2015    Herpes zoster     Impotence of organic origin     Lumbar back pain     Pain in limb     Reflux esophagitis     Skin disorder     Tobacco use disorder 5/26/2015     Past Surgical History:   Procedure Laterality Date    HX HEART CATHETERIZATION  1999    HX HEMORRHOIDECTOMY  1999     Current Facility-Administered Medications   Medication Dose Route Frequency    pantoprazole (PROTONIX) 40 mg in sodium chloride 0.9% 10 mL injection  40 mg IntraVENous Q12H    lactated Ringers infusion  200 mL/hr IntraVENous CONTINUOUS    enoxaparin (LOVENOX) injection 40 mg  40 mg SubCUTAneous Q24H    acetaminophen (TYLENOL) tablet 1,000 mg  1,000 mg Oral Q6H PRN    ondansetron (ZOFRAN) injection 4 mg  4 mg IntraVENous Q4H PRN    morphine 10 mg/ml injection 2-4 mg  2-4 mg IntraVENous Q1H PRN    phenol throat spray (CHLORASEPTIC) 1 Spray  1 Spray Oral PRN    ciprofloxacin (CIPRO) 400 mg in D5W IVPB (premix)  400 mg IntraVENous Q12H    metroNIDAZOLE (FLAGYL) IVPB premix 500 mg  500 mg IntraVENous Q6H     Patient has no known allergies.   Social History     Socioeconomic History    Marital status:      Spouse name: Not on file    Number of children: Not on file    Years of education: Not on file    Highest education level: Not on file   Tobacco Use    Smoking status: Current Every Day Smoker     Packs/day: 1.00     Years: 42.00     Pack years: 42.00    Smokeless tobacco: Former User     Quit date: 1/1/1991   Substance and Sexual Activity    Alcohol use: No    Drug use: No     Social History     Tobacco Use   Smoking Status Current Every Day Smoker    Packs/day: 1.00    Years: 42.00    Pack years: 42.00   Smokeless Tobacco Former User    Quit date: 1/1/1991     Family History   Problem Relation Age of Onset    Stroke Father         CVA    Heart Disease Father     Cancer Mother         Lung     ROS: The patient has no difficulty with chest pain or shortness of breath. No fever or chills. Comprehensive review of systems was otherwise unremarkable except as noted above. Physical Exam:   Visit Vitals  /67   Pulse (!) 57   Temp 98.6 °F (37 °C)   Resp 16   Ht 6' 1\" (1.854 m)   Wt 177 lb (80.3 kg)   SpO2 94%   BMI 23.35 kg/m²     Vitals:    06/18/19 2027 06/18/19 2100 06/18/19 2308 06/19/19 0244   BP: 151/80  132/76 109/67   Pulse: 76  67 (!) 57   Resp: 18  16 16   Temp: 97.6 °F (36.4 °C)  99.2 °F (37.3 °C) 98.6 °F (37 °C)   SpO2: 95% 96% 95% 94%   Weight:       Height:         06/18 1901 - 06/19 0700  In: 300 [I.V.:300]  Out: 1150 [Urine:350]  06/17 0701 - 06/18 1900  In: 3042 [I.V.:3042]  Out: 4100 [Urine:900]    Constitutional: Alert, oriented, cooperative patient in no acute distress; appears stated age    Eyes:Sclera are clear. EOMs intact  ENMT: no external lesions gross hearing normal; no obvious neck masses, no ear or lip lesions, nares normal; +NGT  CV: RRR. Normal perfusion  Resp: No JVD. Breathing is  non-labored; no audible wheezing. GI: dressing dry       Musculoskeletal: unremarkable with normal function. No embolic signs or cyanosis.    Neuro:  Oriented; moves all 4; no focal deficits  Psychiatric: normal affect and mood, no memory impairment    Recent vitals (if inpt):  Patient Vitals for the past 24 hrs:   BP Temp Pulse Resp SpO2   06/19/19 0244 109/67 98.6 °F (37 °C) (!) 57 16 94 %   06/18/19 2308 132/76 99.2 °F (37.3 °C) 67 16 95 %   06/18/19 2100     96 %   06/18/19 2027 151/80 97.6 °F (36.4 °C) 76 18 95 %   06/18/19 2003  98 °F (36.7 °C) 77 20 96 %   06/18/19 1956 147/78  76  95 %   06/18/19 1930 162/79  75 21 96 %   06/18/19 1920 170/85  68 30 99 %   06/18/19 1915 165/84  69 25 99 %   06/18/19 1911 165/81  69 18 98 %   06/18/19 1906 170/89  68 13 100 %   06/18/19 1901 176/84  69 27 97 %   06/18/19 1856 184/79 97.5 °F (36.4 °C) 73 15 96 %   06/18/19 1851 186/85  72 23 96 %   06/18/19 1553 116/83 97.7 °F (36.5 °C) 84 20 94 %   06/18/19 1153 129/82 98.9 °F (37.2 °C) 71 16 93 %   06/18/19 0732 146/84 98.6 °F (37 °C) 78 16 95 %       Labs:  Recent Labs     06/19/19  0315 06/18/19  0511   WBC 11.6* 12.4*   HGB 13.0* 15.6    263   NA  --  139   K  --  3.9   CL  --  101   CO2  --  31   BUN  --  19   CREA  --  0.98   GLU  --  102*       Lab Results   Component Value Date/Time    WBC 11.6 (H) 06/19/2019 03:15 AM    HGB 13.0 (L) 06/19/2019 03:15 AM    PLATELET 438 97/48/5531 03:15 AM    Sodium 139 06/18/2019 05:11 AM    Potassium 3.9 06/18/2019 05:11 AM    Chloride 101 06/18/2019 05:11 AM    CO2 31 06/18/2019 05:11 AM    BUN 19 06/18/2019 05:11 AM    Creatinine 0.98 06/18/2019 05:11 AM    Glucose 102 (H) 06/18/2019 05:11 AM    Bilirubin, total 0.4 06/13/2019 08:16 PM    AST (SGOT) 13 (L) 06/13/2019 08:16 PM    ALT (SGPT) 23 06/13/2019 08:16 PM    Alk. phosphatase 85 06/13/2019 08:16 PM       CT Results  (Last 48 hours)               06/18/19 0659  CT ABD PELV WO CONT Final result    Impression:  IMPRESSION:        1. Apparent interval worsening of mechanical small bowel obstruction with sharp   transition point in the left pelvis, likely secondary to adhesions or bands. 2.  Increased trace pelvic ascites, with a new small right pleural effusion. 3.  Other chronic findings as above. Narrative:  CT ABDOMEN AND PELVIS WITHOUT CONTRAST       HISTORY: NO oral and no IV contrast; Eval interval changes in SBO and enteritis,   63 years Male       COMPARISON: CT abdomen pelvis June 13, 2019       TECHNIQUE:  Noncontrast axial helical CT images were obtained from above the   diaphragm through the pelvis. Coronal reformatted images were obtained at the   scanner console and made available for review. Radiation dose reduction techniques were used for this study:  Our CT scanners   use one or all of the following: Automated exposure control, adjustment of the   mA and/or kVp according to patient's size, iterative reconstruction. FINDINGS:       ABDOMEN:   New small right pleural effusion with associated mild dependent subsegmental   atelectasis bilateral lung bases. Normal-appearing liver, gallbladder, spleen,   pancreas, bilateral adrenal glands and kidneys. Moderate calcific   atherosclerosis of a borderline aneurysmal infrarenal abdominal aorta measuring   up to 2.6 x 3.0 cm, unchanged. No evidence of significant lymphadenopathy. New   esophageal tube tip terminating in the gastric body. Interval increase in   diffuse fluid-filled dilatation of the proximal small bowel which is not well   evaluated on this noncontrast examination, however there appears to be a sharp   transition point in the left pelvis into decompressed distal small bowel loops,   this finding likely represents worsening mechanical small bowel obstruction   secondary to adhesions or bands. No evidence of intraperitoneal free air. PELVIS:   Normal-appearing urinary bladder. Punctate prostatic calcifications. Normal-appearing seminal vesicles. Sigmoid diverticula. Normal-appearing   proximal colon. Increased trace pelvic free fluid.   No evidence of significant   inguinal or pelvic sidewall lymphadenopathy. Chronic healed fracture   deformities of the right superior pubic ramus and inferior pubic ramus appears   similar to prior. Visualized osseous structures otherwise unremarkable. chest X-ray      I reviewed recent labs, recent radiologic studies, and pertinent records including other doctor notes if needed. I independently reviewed radiology images for studies I described above or studies I have ordered.    Admission date (for inpatients): 6/13/2019   * No surgery found *  Procedure(s):  LAPAROTOMY EXPLORATORY REDUCTION OF INTERNAL HERNIA / EXCISION OF MESENTERIC MASS    ASSESSMENT/PLAN:  Problem List  Date Reviewed: 6/13/2019          Codes Class Noted    Oliguria ICD-10-CM: R34  ICD-9-CM: 788.5  6/18/2019        Mesenteric mass ICD-10-CM: K63.9  ICD-9-CM: 568.89  6/18/2019        Internal hernia ICD-10-CM: K45.8  ICD-9-CM: 553.8  6/18/2019        3.2cm AAA on CT 6/13/19 ICD-10-CM: I71.4  ICD-9-CM: 441.4  6/14/2019        Hiatal hernia ICD-10-CM: K44.9  ICD-9-CM: 553.3  6/14/2019        Generalized abdominal pain ICD-10-CM: R10.84  ICD-9-CM: 789.07  6/13/2019        * (Principal) Small Bowel Enteritis on CT ICD-10-CM: K52.9  ICD-9-CM: 558.9  6/13/2019        SBO [facility] obstruction) ([facility] ICD-10-CM: V48.665  ICD-9-CM: 560.9  6/13/2019    Overview Signed 6/18/2019  5:26 PM by Noel Bailey MD     6/18/19 s/p Expl lap for refractory SBO; Dr Meryle Mealing             Leukocytosis ICD-10-CM: A33.524  ICD-9-CM: 288.60  6/13/2019        Tobacco use disorder ICD-10-CM: G98.378  ICD-9-CM: 305.1  5/26/2015        Esophageal reflux ICD-10-CM: K21.9  ICD-9-CM: 530.81  5/26/2015        Chronic airway obstruction, not elsewhere classified ICD-10-CM: J44.9  ICD-9-CM: 486  5/26/2015            Principal Problem:    Small Bowel Enteritis on CT (6/13/2019)    Active Problems:    Tobacco use disorder (5/26/2015)      Generalized abdominal pain (6/13/2019)      SBO (small bowel obstruction) ([facility] [address] (6/13/2019)      Overview: 6/18/19 s/p Expl lap for refractory SBO; Dr Charlie Harper      Leukocytosis (6/13/2019)      3.2cm AAA on CT 6/13/19 (6/14/2019)      Hiatal hernia (6/14/2019)      Oliguria (6/18/2019)      Mesenteric mass (6/18/2019)      Internal hernia (6/18/2019)         SBO  He is s/p expl lap for refractory SBO and reduction of closed loop obstruction from internal hernia and excision of mesenteric mass on 6/18/19. NPO/NGT/IVF/Bowel rest  IV Flagyl/Cipro     GI will likely perform colonoscopy as outpt after acute illness resolves      Abd pain  Prn pain meds    Leukocytosis  follow    3.2cm AAA on CT 6/13/19  Refer to vascular surgery as outpt for follow-up    Hiatal hernia on CT 6/13/19  Asymptomatic, no surgery planned    Oliguria (new problem on 6/18/19)  Hydrate  Follow        I have personally performed a face-to-face diagnostic evaluation and management  service on this patient. I have independently seen the patient. I have independently obtained the above history from the patient/family. I have independently examined the patient with above findings. I have independently reviewed data/labs for this patient and developed the above plan of care (MDM). Signed: Lloyd [address].  Charlie Harper MD, FACS

## 2019-06-20 LAB
ANION GAP SERPL CALC-SCNC: 4 MMOL/L (ref 7–16)
BUN SERPL-MCNC: 18 MG/DL (ref 8–23)
CALCIUM SERPL-MCNC: 8.3 MG/DL (ref 8.3–10.4)
CHLORIDE SERPL-SCNC: 104 MMOL/L (ref 98–107)
CO2 SERPL-SCNC: 31 MMOL/L (ref 21–32)
CREAT SERPL-MCNC: 0.86 MG/DL (ref 0.8–1.5)
ERYTHROCYTE [DISTWIDTH] IN BLOOD BY AUTOMATED COUNT: 13 % (ref 11.9–14.6)
GLUCOSE SERPL-MCNC: 90 MG/DL (ref 65–100)
HCT VFR BLD AUTO: 37.5 % (ref 41.1–50.3)
HGB BLD-MCNC: 12 G/DL (ref 13.6–17.2)
MCH RBC QN AUTO: 30.7 PG (ref 26.1–32.9)
MCHC RBC AUTO-ENTMCNC: 32 G/DL (ref 31.4–35)
MCV RBC AUTO: 95.9 FL (ref 79.6–97.8)
NRBC # BLD: 0 K/UL (ref 0–0.2)
PLATELET # BLD AUTO: 219 K/UL (ref 150–450)
PMV BLD AUTO: 11.2 FL (ref 9.4–12.3)
POTASSIUM SERPL-SCNC: 4 MMOL/L (ref 3.5–5.1)
RBC # BLD AUTO: 3.91 M/UL (ref 4.23–5.6)
SODIUM SERPL-SCNC: 139 MMOL/L (ref 136–145)
WBC # BLD AUTO: 13.4 K/UL (ref 4.3–11.1)

## 2019-06-20 PROCEDURE — 85027 COMPLETE CBC AUTOMATED: CPT

## 2019-06-20 PROCEDURE — 74011000250 HC RX REV CODE- 250: Performed by: SURGERY

## 2019-06-20 PROCEDURE — 65270000029 HC RM PRIVATE

## 2019-06-20 PROCEDURE — 80048 BASIC METABOLIC PNL TOTAL CA: CPT

## 2019-06-20 PROCEDURE — 94760 N-INVAS EAR/PLS OXIMETRY 1: CPT

## 2019-06-20 PROCEDURE — 77010033678 HC OXYGEN DAILY

## 2019-06-20 PROCEDURE — 74011250636 HC RX REV CODE- 250/636: Performed by: SURGERY

## 2019-06-20 PROCEDURE — 77030020255 HC SOL INJ LR 1000ML BG

## 2019-06-20 PROCEDURE — C9113 INJ PANTOPRAZOLE SODIUM, VIA: HCPCS | Performed by: SURGERY

## 2019-06-20 PROCEDURE — 36415 COLL VENOUS BLD VENIPUNCTURE: CPT

## 2019-06-20 RX ORDER — BUDESONIDE AND FORMOTEROL FUMARATE DIHYDRATE 160; 4.5 UG/1; UG/1
2 AEROSOL RESPIRATORY (INHALATION)
Status: DISCONTINUED | OUTPATIENT
Start: 2019-06-20 | End: 2019-06-26 | Stop reason: HOSPADM

## 2019-06-20 RX ADMIN — SODIUM CHLORIDE, SODIUM LACTATE, POTASSIUM CHLORIDE, AND CALCIUM CHLORIDE 200 ML/HR: 600; 310; 30; 20 INJECTION, SOLUTION INTRAVENOUS at 13:43

## 2019-06-20 RX ADMIN — CIPROFLOXACIN 400 MG: 2 INJECTION, SOLUTION INTRAVENOUS at 06:28

## 2019-06-20 RX ADMIN — ENOXAPARIN SODIUM 40 MG: 40 INJECTION SUBCUTANEOUS at 08:13

## 2019-06-20 RX ADMIN — MORPHINE SULFATE 3 MG: 10 INJECTION, SOLUTION INTRAMUSCULAR; INTRAVENOUS at 17:15

## 2019-06-20 RX ADMIN — MORPHINE SULFATE 3 MG: 10 INJECTION, SOLUTION INTRAMUSCULAR; INTRAVENOUS at 03:20

## 2019-06-20 RX ADMIN — MORPHINE SULFATE 3 MG: 10 INJECTION, SOLUTION INTRAMUSCULAR; INTRAVENOUS at 06:39

## 2019-06-20 RX ADMIN — MORPHINE SULFATE 3 MG: 10 INJECTION, SOLUTION INTRAMUSCULAR; INTRAVENOUS at 21:07

## 2019-06-20 RX ADMIN — ONDANSETRON 4 MG: 2 INJECTION INTRAMUSCULAR; INTRAVENOUS at 06:39

## 2019-06-20 RX ADMIN — SODIUM CHLORIDE, SODIUM LACTATE, POTASSIUM CHLORIDE, AND CALCIUM CHLORIDE 200 ML/HR: 600; 310; 30; 20 INJECTION, SOLUTION INTRAVENOUS at 18:34

## 2019-06-20 RX ADMIN — SODIUM CHLORIDE 40 MG: 9 INJECTION, SOLUTION INTRAMUSCULAR; INTRAVENOUS; SUBCUTANEOUS at 08:13

## 2019-06-20 RX ADMIN — MORPHINE SULFATE 3 MG: 10 INJECTION, SOLUTION INTRAMUSCULAR; INTRAVENOUS at 14:08

## 2019-06-20 RX ADMIN — SODIUM CHLORIDE, SODIUM LACTATE, POTASSIUM CHLORIDE, AND CALCIUM CHLORIDE 200 ML/HR: 600; 310; 30; 20 INJECTION, SOLUTION INTRAVENOUS at 06:31

## 2019-06-20 RX ADMIN — METRONIDAZOLE 500 MG: 500 INJECTION, SOLUTION INTRAVENOUS at 02:46

## 2019-06-20 RX ADMIN — MORPHINE SULFATE 3 MG: 10 INJECTION, SOLUTION INTRAMUSCULAR; INTRAVENOUS at 10:56

## 2019-06-20 RX ADMIN — SODIUM CHLORIDE 40 MG: 9 INJECTION, SOLUTION INTRAMUSCULAR; INTRAVENOUS; SUBCUTANEOUS at 21:07

## 2019-06-20 RX ADMIN — MORPHINE SULFATE 3 MG: 10 INJECTION, SOLUTION INTRAMUSCULAR; INTRAVENOUS at 23:33

## 2019-06-20 NOTE — PROGRESS NOTES
H&P/Consult Note/Progress Note/Office Note:   Luis Martell  MRN: [id number]  :1955  Age:63 y.o.    HPI: Luis Martell is a 58 y.o. male who is s/p expl lap for refractory SBO and reduction of closed loop obstruction from internal hernia and excision of mesenteric mass on 19. Prior to surgery he was admitted from the ER on 19 after presenting with   a 1 day h/o progressive, constant, severe, non-radiating, generalized abd pain. Nothing made pain better or worse. No associated N/V/F/C or diarrhea  Normally has 1-2 solid BMs qday but lately BM only every other day  No h/o bloody diarrhea or sign diarrhea issues    WBC 12.7k  Surgery consulted after CT showed acute enteritis with component of underlying obstrauction  No h/o Crohn's or lymphoma  He is s/p appy in 1990s        19 CT abd/pelvis with PO and IV contrast  - Liver: Within normal limits. - Gallbladder and bile ducts: Within normal limits. - Spleen: Within normal limits. - Urinary tract: Within normal limits. - Adrenals: Within normal limits. - Pancreas: Within normal limits. - Gastrointestinal tract: There is an approximate 15 cm long segment of small  bowel wall thickening in the pelvis, in the region of the distal ileum, with  mesenteric hyperemia and trace free pelvic fluid. Findings are consistent with  acute enteritis. The more proximal small bowel loops are mildly distended,  suggesting a component of underlying obstruction as well. The terminal ileum has  a normal appearance. The colon is unremarkable. The appendix is not definitely  identified. No focal abscess or free air is identified.  - Retroperitoneum: There is a 3.2 cm fusiform infrarenal abdominal aortic  aneurysm, without evidence of dissection or rupture. - Peritoneal cavity and abdominal wall: Within normal limits. - Pelvis: Within normal limits. - Spine/bones: No acute process. - Other comments: The lung bases are clear.  There is a small hiatal hernia. IMPRESSION:   1. Acute enteritis involving an approximately 15 cm long segment of pelvic small  bowel, with an associated mild small bowel obstruction. There is trace free  fluid in the pelvis as well.  2. 3.2 cm infrarenal abdominal aortic aneurysm. 3. Small hiatal hernia        6/18/19 CT abd/pevls without oral or IV contrast    ABD:  New small right pleural effusion with associated mild dependent subsegmental  atelectasis bilateral lung bases. Normal-appearing liver, gallbladder, spleen,  pancreas, bilateral adrenal glands and kidneys. Moderate calcific  atherosclerosis of a borderline aneurysmal infrarenal abdominal aorta measuring  up to 2.6 x 3.0 cm, unchanged. No evidence of significant lymphadenopathy. New  esophageal tube tip terminating in the gastric body. Interval increase in  diffuse fluid-filled dilatation of the proximal small bowel which is not well  evaluated on this noncontrast examination, however there appears to be a sharp  transition point in the left pelvis into decompressed distal small bowel loops,  this finding likely represents worsening mechanical small bowel obstruction  secondary to adhesions or bands. No evidence of intraperitoneal free air.     PELVIS:  Normal-appearing urinary bladder. Punctate prostatic calcifications. Normal-appearing seminal vesicles. Sigmoid diverticula. Normal-appearing  proximal colon. Increased trace pelvic free fluid. No evidence of significant  inguinal or pelvic sidewall lymphadenopathy. Chronic healed fracture  deformities of the right superior pubic ramus and inferior pubic ramus appears  similar to prior. Visualized osseous structures otherwise unremarkable.     IMPRESSION:      1. Apparent interval worsening of mechanical small bowel obstruction with sharp  transition point in the left pelvis, likely secondary to adhesions or bands. 2.  Increased trace pelvic ascites, with a new small right pleural effusion.   3. Other chronic findings as above.                    Additional hx:  6/15/19 passing flatus; pain better  6/16/19 passing more flatus, no bm yet; some lower abd pain after getting up to BR earlier  6/17/19 passing more flatus; no BM yet; AF; mild lower abd pain  6/18/19 passing flatus; 1.2-1.8 Liters NGT dark output qday;  Mild lower abd pain; repeat CT is worse as above. To OR   6/19/19 POD1; no complaints  6/20/19 POD2; feels OK, no flatus          Past Medical History:   Diagnosis Date    Arteritis, unspecified ([facility] [address]     Asthma     Body mass index between 19-24, adult     Bronchiolitis     CA in situ skin     Chronic airway obstruction, not elsewhere classified 5/26/2015    Coccyx disorder     Colon polyps     Crushing injury     Dermatitis     dermatitis/urticaria    ED (erectile dysfunction)     Esophageal reflux 5/26/2015    Herpes zoster     Impotence of organic origin     Lumbar back pain     Pain in limb     Reflux esophagitis     Skin disorder     Tobacco use disorder 5/26/2015     Past Surgical History:   Procedure Laterality Date    HX HEART CATHETERIZATION  1999    HX HEMORRHOIDECTOMY  1999     Current Facility-Administered Medications   Medication Dose Route Frequency    pantoprazole (PROTONIX) 40 mg in sodium chloride 0.9% 10 mL injection  40 mg IntraVENous Q12H    lactated Ringers infusion  200 mL/hr IntraVENous CONTINUOUS    enoxaparin (LOVENOX) injection 40 mg  40 mg SubCUTAneous Q24H    acetaminophen (TYLENOL) tablet 1,000 mg  1,000 mg Oral Q6H PRN    ondansetron (ZOFRAN) injection 4 mg  4 mg IntraVENous Q4H PRN    morphine 10 mg/ml injection 2-4 mg  2-4 mg IntraVENous Q1H PRN    phenol throat spray (CHLORASEPTIC) 1 Spray  1 Spray Oral PRN     Patient has no known allergies.   Social History     Socioeconomic History    Marital status:      Spouse name: Not on file    Number of children: Not on file    Years of education: Not on file    Highest education level: Not on file   Tobacco Use    Smoking status: Current Every Day Smoker     Packs/day: 1.00     Years: 42.00     Pack years: 42.00    Smokeless tobacco: Former User     Quit date: 1/1/1991   Substance and Sexual Activity    Alcohol use: No    Drug use: No     Social History     Tobacco Use   Smoking Status Current Every Day Smoker    Packs/day: 1.00    Years: 42.00    Pack years: 42.00   Smokeless Tobacco Former User    Quit date: 1/1/1991     Family History   Problem Relation Age of Onset    Stroke Father         CVA    Heart Disease Father     Cancer Mother         Lung     ROS: The patient has no difficulty with chest pain or shortness of breath. No fever or chills. Comprehensive review of systems was otherwise unremarkable except as noted above. Physical Exam:   Visit Vitals  /64 (BP 1 Location: Right arm, BP Patient Position: At rest)   Pulse (!) 58   Temp 98.1 °F (36.7 °C)   Resp 18   Ht 6' 1\" (1.854 m)   Wt 177 lb (80.3 kg)   SpO2 93%   BMI 23.35 kg/m²     Vitals:    06/19/19 1603 06/19/19 1948 06/19/19 2327 06/20/19 0319   BP: 116/61 122/60 117/60 117/64   Pulse: 60 (!) 58 (!) 56 (!) 58   Resp: 18 16 18 18   Temp: 97.7 °F (36.5 °C) 98.2 °F (36.8 °C) 98.1 °F (36.7 °C) 98.1 °F (36.7 °C)   SpO2: 93% 93% 93% 93%   Weight:       Height:         No intake/output data recorded. 06/18 1901 - 06/20 0700  In: 2528 [I.V.:2528]  Out: 4050 [Urine:1850]    Constitutional: Alert, oriented, cooperative patient in no acute distress; appears stated age    Eyes:Sclera are clear. EOMs intact  ENMT: no external lesions gross hearing normal; no obvious neck masses, no ear or lip lesions, nares normal; +NGT  CV: RRR. Normal perfusion  Resp: No JVD. Breathing is  non-labored; no audible wheezing. GI: dressing dry, no cellulitis       Musculoskeletal: unremarkable with normal function. No embolic signs or cyanosis.    Neuro:  Oriented; moves all 4; no focal deficits  Psychiatric: normal affect and mood, no memory impairment    Recent vitals (if inpt):  Patient Vitals for the past 24 hrs:   BP Temp Pulse Resp SpO2   06/20/19 0319 117/64 98.1 °F (36.7 °C) (!) 58 18 93 %   06/19/19 2327 117/60 98.1 °F (36.7 °C) (!) 56 18 93 %   06/19/19 1948 122/60 98.2 °F (36.8 °C) (!) 58 16 93 %   06/19/19 1603 116/61 97.7 °F (36.5 °C) 60 18 93 %   06/19/19 1239 115/60 97.5 °F (36.4 °C) 60 18 93 %   06/19/19 0755 120/68 98.1 °F (36.7 °C) (!) 53 16 93 %   06/19/19 0728     93 %       Labs:  Recent Labs     06/20/19  0539 06/19/19  0315   WBC 13.4* 11.6*   HGB 12.0* 13.0*    234    140   K 4.0 4.3    103   CO2 31 29   BUN 18 24*   CREA 0.86 0.93   GLU 90 130*   TBILI  --  0.7   SGOT  --  41*   ALT  --  29   AP  --  69       Lab Results   Component Value Date/Time    WBC 13.4 (H) 06/20/2019 05:39 AM    HGB 12.0 (L) 06/20/2019 05:39 AM    PLATELET 069 65/85/0144 05:39 AM    Sodium 139 06/20/2019 05:39 AM    Potassium 4.0 06/20/2019 05:39 AM    Chloride 104 06/20/2019 05:39 AM    CO2 31 06/20/2019 05:39 AM    BUN 18 06/20/2019 05:39 AM    Creatinine 0.86 06/20/2019 05:39 AM    Glucose 90 06/20/2019 05:39 AM    Bilirubin, total 0.7 06/19/2019 03:15 AM    AST (SGOT) 41 (H) 06/19/2019 03:15 AM    ALT (SGPT) 29 06/19/2019 03:15 AM    Alk. phosphatase 69 06/19/2019 03:15 AM       CT Results  (Last 48 hours)    None        chest X-ray      I reviewed recent labs, recent radiologic studies, and pertinent records including other doctor notes if needed. I independently reviewed radiology images for studies I described above or studies I have ordered.    Admission date (for inpatients): 6/13/2019   * No surgery found *  Procedure(s):  LAPAROTOMY EXPLORATORY REDUCTION OF INTERNAL HERNIA / EXCISION OF MESENTERIC MASS    ASSESSMENT/PLAN:  Problem List  Date Reviewed: 6/13/2019          Codes Class Noted    Oliguria ICD-10-CM: R34  ICD-9-CM: 788.5  6/18/2019        Mesenteric mass ICD-10-CM: K63.9  ICD-9-CM: 568.89  6/18/2019 Internal hernia ICD-10-CM: K45.8  ICD-9-CM: 553.8  6/18/2019        3.2cm AAA on CT 6/13/19 ICD-10-CM: I71.4  ICD-9-CM: 441.4  6/14/2019        Hiatal hernia ICD-10-CM: K44.9  ICD-9-CM: 553.3  6/14/2019        Generalized abdominal pain ICD-10-CM: R10.84  ICD-9-CM: 789.07  6/13/2019        * (Principal) Small Bowel Enteritis on CT ICD-10-CM: K52.9  ICD-9-CM: 558.9  6/13/2019        SBO (small bowel obstruction) ([facility] ICD-10-CM: Z08.806  ICD-9-CM: 560.9  6/13/2019    Overview Signed 6/18/2019  5:26 PM by Ángel Portillo MD     6/18/19 s/p Expl lap for refractory SBO; Dr Maria M Connor             Leukocytosis ICD-10-CM: F54.438  ICD-9-CM: 288.60  6/13/2019        Tobacco use disorder ICD-10-CM: V02.530  ICD-9-CM: 305.1  5/26/2015        Esophageal reflux ICD-10-CM: K21.9  ICD-9-CM: 530.81  5/26/2015        Chronic airway obstruction, not elsewhere classified ICD-10-CM: J44.9  ICD-9-CM: 621  5/26/2015            Principal Problem:    Small Bowel Enteritis on CT (6/13/2019)    Active Problems:    Tobacco use disorder (5/26/2015)      Generalized abdominal pain (6/13/2019)      [facility]BO ([facility] obstruction) ([facility] (6/13/2019)      Overview: 6/18/19 s/p Expl lap for refractory SBO; Dr Maria M Connor      Leukocytosis (6/13/2019)      3.2cm AAA on CT 6/13/19 (6/14/2019)      Hiatal hernia (6/14/2019)      Oliguria (6/18/2019)      Mesenteric mass (6/18/2019)      Internal hernia (6/18/2019)         SBO  He is s/p expl lap for refractory SBO and reduction of closed loop obstruction from internal hernia and excision of mesenteric mass on 6/18/19.     Await bowel recovery  May take awhile with swelling and ischemia noted intra-op    NPO/NGT/IVF/Bowel rest  Stop IV abx now that infectious enteritis has been ruled out     GI will likely perform colonoscopy as outpt after acute illness resolves      Abd pain  Prn pain meds    Mild Leukocytosis  stable  follow    3.2cm AAA on CT 6/13/19  Refer to vascular surgery as outpt for follow-up    Hiatal hernia on CT 6/13/19  Asymptomatic, no surgery planned    Benign Mesenteric mass unrelated to SBO  DIAGNOSIS  MESENTERIC MASS: BENIGN FIBROADIPOSE TISSUE, CONSISTENT WITH LIPOMA. Electronically signed out on 6/20/2019 10:56 by Eugenio Abbasi. Sendy Antony MD       I have personally performed a face-to-face diagnostic evaluation and management  service on this patient. I have independently seen the patient. I have independently obtained the above history from the patient/family. I have independently examined the patient with above findings. I have independently reviewed data/labs for this patient and developed the above plan of care (MDM). Signed: Karon Barrera.  Mindi Santos MD, FACS

## 2019-06-21 LAB
ANION GAP SERPL CALC-SCNC: 6 MMOL/L (ref 7–16)
BUN SERPL-MCNC: 13 MG/DL (ref 8–23)
CALCIUM SERPL-MCNC: 8.1 MG/DL (ref 8.3–10.4)
CHLORIDE SERPL-SCNC: 101 MMOL/L (ref 98–107)
CO2 SERPL-SCNC: 33 MMOL/L (ref 21–32)
CREAT SERPL-MCNC: 0.76 MG/DL (ref 0.8–1.5)
ERYTHROCYTE [DISTWIDTH] IN BLOOD BY AUTOMATED COUNT: 12.8 % (ref 11.9–14.6)
GLUCOSE SERPL-MCNC: 79 MG/DL (ref 65–100)
HCT VFR BLD AUTO: 39.1 % (ref 41.1–50.3)
HGB BLD-MCNC: 12.2 G/DL (ref 13.6–17.2)
MCH RBC QN AUTO: 30.1 PG (ref 26.1–32.9)
MCHC RBC AUTO-ENTMCNC: 31.2 G/DL (ref 31.4–35)
MCV RBC AUTO: 96.5 FL (ref 79.6–97.8)
NRBC # BLD: 0 K/UL (ref 0–0.2)
PLATELET # BLD AUTO: 233 K/UL (ref 150–450)
PMV BLD AUTO: 11.6 FL (ref 9.4–12.3)
POTASSIUM SERPL-SCNC: 3.8 MMOL/L (ref 3.5–5.1)
RBC # BLD AUTO: 4.05 M/UL (ref 4.23–5.6)
SODIUM SERPL-SCNC: 140 MMOL/L (ref 136–145)
WBC # BLD AUTO: 9.5 K/UL (ref 4.3–11.1)

## 2019-06-21 PROCEDURE — C9113 INJ PANTOPRAZOLE SODIUM, VIA: HCPCS | Performed by: SURGERY

## 2019-06-21 PROCEDURE — 77030020255 HC SOL INJ LR 1000ML BG

## 2019-06-21 PROCEDURE — 85027 COMPLETE CBC AUTOMATED: CPT

## 2019-06-21 PROCEDURE — 80048 BASIC METABOLIC PNL TOTAL CA: CPT

## 2019-06-21 PROCEDURE — 65270000029 HC RM PRIVATE

## 2019-06-21 PROCEDURE — 74011250636 HC RX REV CODE- 250/636: Performed by: SURGERY

## 2019-06-21 PROCEDURE — 36415 COLL VENOUS BLD VENIPUNCTURE: CPT

## 2019-06-21 PROCEDURE — 74011000250 HC RX REV CODE- 250: Performed by: SURGERY

## 2019-06-21 RX ORDER — DEXTROSE, SODIUM CHLORIDE, AND POTASSIUM CHLORIDE 5; .45; .15 G/100ML; G/100ML; G/100ML
75 INJECTION INTRAVENOUS CONTINUOUS
Status: DISCONTINUED | OUTPATIENT
Start: 2019-06-21 | End: 2019-06-26 | Stop reason: HOSPADM

## 2019-06-21 RX ADMIN — MORPHINE SULFATE 4 MG: 10 INJECTION, SOLUTION INTRAMUSCULAR; INTRAVENOUS at 21:25

## 2019-06-21 RX ADMIN — DEXTROSE MONOHYDRATE, SODIUM CHLORIDE, AND POTASSIUM CHLORIDE 150 ML/HR: 50; 4.5; 1.49 INJECTION, SOLUTION INTRAVENOUS at 18:39

## 2019-06-21 RX ADMIN — ENOXAPARIN SODIUM 40 MG: 40 INJECTION SUBCUTANEOUS at 08:16

## 2019-06-21 RX ADMIN — MORPHINE SULFATE 4 MG: 10 INJECTION, SOLUTION INTRAMUSCULAR; INTRAVENOUS at 13:35

## 2019-06-21 RX ADMIN — MORPHINE SULFATE 4 MG: 10 INJECTION, SOLUTION INTRAMUSCULAR; INTRAVENOUS at 02:55

## 2019-06-21 RX ADMIN — DEXTROSE MONOHYDRATE, SODIUM CHLORIDE, AND POTASSIUM CHLORIDE 150 ML/HR: 50; 4.5; 1.49 INJECTION, SOLUTION INTRAVENOUS at 10:29

## 2019-06-21 RX ADMIN — SODIUM CHLORIDE, SODIUM LACTATE, POTASSIUM CHLORIDE, AND CALCIUM CHLORIDE 200 ML/HR: 600; 310; 30; 20 INJECTION, SOLUTION INTRAVENOUS at 02:56

## 2019-06-21 RX ADMIN — SODIUM CHLORIDE 40 MG: 9 INJECTION, SOLUTION INTRAMUSCULAR; INTRAVENOUS; SUBCUTANEOUS at 10:29

## 2019-06-21 RX ADMIN — SODIUM CHLORIDE 40 MG: 9 INJECTION, SOLUTION INTRAMUSCULAR; INTRAVENOUS; SUBCUTANEOUS at 21:20

## 2019-06-21 NOTE — PROGRESS NOTES
Sat up in chair for 21/2 hours and tolerated well. Ng tube retaped earlier and continues to have large volume output.

## 2019-06-21 NOTE — PROGRESS NOTES
END OF SHIFT NOTE:    INTAKE/OUTPUT  06/20 0701 - 06/21 0700  In: 8992 [I.V.:3875]  Out: 3908 [Urine:2500]  Voiding: YES  Catheter: NO  Drain:   Nasogastric Tube 06/13/19 (Active)   Site Assessment Clean, dry, & intact 6/21/2019  7:30 AM   Securement Device Tape 6/21/2019  7:30 AM   G Port Status Intermittent Suction 6/21/2019  7:30 AM   External Insertion Simone (cms) 78 cms 6/21/2019  4:00 AM   Action Taken Retaped 6/21/2019  2:37 PM   Drainage Description Green 6/21/2019  7:30 AM   Drainage Chamber Level (ml) 700 ml 6/21/2019  6:44 AM   Output (ml) 900 ml 6/21/2019  6:45 PM               Flatus: Patient does not have flatus present. Stool:  0 occurrences. Characteristics:       Emesis: 0 occurrences. Characteristics:        VITAL SIGNS  Patient Vitals for the past 12 hrs:   Temp Pulse Resp BP   06/21/19 1507 98.2 °F (36.8 °C) 60 17 156/81   06/21/19 1213 98.2 °F (36.8 °C) 63 17 143/85       Pain Assessment  Pain Intensity 1: 2 (06/21/19 1441)  Pain Location 1: Abdomen  Pain Intervention(s) 1: Medication (see MAR)  Patient Stated Pain Goal: 2    Ambulating  No    Shift report given to oncoming nurse at the bedside.     Iman You RN

## 2019-06-21 NOTE — PROGRESS NOTES
END OF SHIFT NOTE:    INTAKE/OUTPUT  06/19 0701 - 06/20 0700  In: 2228 [I.V.:2228]  Out: 2400 [Urine:1500]  Voiding: NO  Catheter: YES  Drain:   Nasogastric Tube 06/13/19 (Active)   Site Assessment Clean, dry, & intact 6/20/2019  1:51 PM   Securement Device Tape 6/20/2019  1:51 PM   G Port Status Intermittent Suction 6/20/2019  1:51 PM   External Insertion Simone (cms) 78 cms 6/19/2019  6:59 AM   Action Taken Other (comment) 6/18/2019  6:45 AM   Drainage Description Tessa Bee 6/20/2019  1:51 PM   Drainage Chamber Level (ml) 850 ml 6/20/2019  5:20 PM   Output (ml) 200 ml 6/20/2019  5:20 PM               Flatus: Patient does not have flatus present. Stool:  0 occurrences. Characteristics:       Emesis: 0 occurrences. Characteristics:        VITAL SIGNS  Patient Vitals for the past 12 hrs:   Temp Pulse Resp BP SpO2   06/20/19 1504 98.8 °F (37.1 °C) (!) 54 14 140/77 94 %   06/20/19 1135 98.5 °F (36.9 °C) (!) 56 16 148/72 95 %   06/20/19 0952     92 %       Pain Assessment  Pain Intensity 1: 6 (06/20/19 1815)  Pain Location 1: Abdomen  Pain Intervention(s) 1: Medication (see MAR)  Patient Stated Pain Goal: 0    Ambulating  Yes with assistance    Shift report given to oncoming nurse at the bedside.     Arturo Barrios RN

## 2019-06-21 NOTE — PROGRESS NOTES
END OF SHIFT NOTE:    INTAKE/OUTPUT  06/20 0701 - 06/21 0700  In: 3309 [I.V.:3875]  Out: 9309 [Urine:2500]  Voiding: YES  Catheter: YES  Drain:   Nasogastric Tube 06/13/19 (Active)   Site Assessment Clean, dry, & intact 6/21/2019  4:00 AM   Securement Device Tape 6/21/2019  4:00 AM   G Port Status Intermittent Suction 6/21/2019  4:00 AM   External Insertion Simone (cms) 78 cms 6/21/2019  4:00 AM   Action Taken Other (comment) 6/21/2019  6:44 AM   Drainage Description Mica Steele 6/21/2019  4:00 AM   Drainage Chamber Level (ml) 350 ml 6/20/2019  9:00 PM   Output (ml) 350 ml 6/21/2019  6:44 AM               Flatus: Patient does not have flatus present. Stool:  0 occurrences. Characteristics:       Emesis: 0 occurrences. Characteristics:        VITAL SIGNS  Patient Vitals for the past 12 hrs:   Temp Pulse Resp BP SpO2   06/21/19 0427 97.5 °F (36.4 °C) (!) 57 17 138/73 92 %   06/20/19 2327 98.4 °F (36.9 °C) (!) 59 17 152/72 91 %   06/20/19 2030 98.1 °F (36.7 °C) (!) 57 16 155/73 94 %       Pain Assessment  Pain Intensity 1: 8 (06/21/19 0257)  Pain Location 1: Abdomen  Pain Intervention(s) 1: Medication (see MAR)  Patient Stated Pain Goal: 2    Ambulating  No  Robbins was removed at 0630 am. Clean silke urine noted. Shift report given to oncoming nurse at the bedside.     Svetlana Turner

## 2019-06-21 NOTE — PROGRESS NOTES
H&P/Consult Note/Progress Note/Office Note:   Deepti Mueller  MRN: [id number]  :1955  Age:63 y.o.    HPI: Deepti Mueller is a 58 y.o. male who is s/p open reduction of closed loop obstruction from internal hernia 19 which caused a refractory SBO. Prior to surgery he was admitted from the ER on 19 after presenting with   a 1 day h/o progressive, constant, severe, non-radiating, generalized abd pain. Nothing made pain better or worse. No associated N/V/F/C or diarrhea  Normally has 1-2 solid BMs qday but lately BM only every other day  No h/o bloody diarrhea or sign diarrhea issues    WBC 12.7k  Surgery consulted after CT showed acute enteritis with component of underlying obstrauction  No h/o Crohn's or lymphoma  He is s/p appy in 19 CT abd/pelvis with PO and IV contrast  - Liver: Within normal limits. - Gallbladder and bile ducts: Within normal limits. - Spleen: Within normal limits. - Urinary tract: Within normal limits. - Adrenals: Within normal limits. - Pancreas: Within normal limits. - Gastrointestinal tract: There is an approximate 15 cm long segment of small  bowel wall thickening in the pelvis, in the region of the distal ileum, with  mesenteric hyperemia and trace free pelvic fluid. Findings are consistent with  acute enteritis. The more proximal small bowel loops are mildly distended,  suggesting a component of underlying obstruction as well. The terminal ileum has  a normal appearance. The colon is unremarkable. The appendix is not definitely  identified. No focal abscess or free air is identified.  - Retroperitoneum: There is a 3.2 cm fusiform infrarenal abdominal aortic  aneurysm, without evidence of dissection or rupture. - Peritoneal cavity and abdominal wall: Within normal limits. - Pelvis: Within normal limits. - Spine/bones: No acute process. - Other comments: The lung bases are clear. There is a small hiatal hernia. IMPRESSION:   1. Acute enteritis involving an approximately 15 cm long segment of pelvic small  bowel, with an associated mild small bowel obstruction. There is trace free  fluid in the pelvis as well.  2. 3.2 cm infrarenal abdominal aortic aneurysm. 3. Small hiatal hernia        6/18/19 CT abd/pevls without oral or IV contrast    ABD:  New small right pleural effusion with associated mild dependent subsegmental  atelectasis bilateral lung bases. Normal-appearing liver, gallbladder, spleen,  pancreas, bilateral adrenal glands and kidneys. Moderate calcific  atherosclerosis of a borderline aneurysmal infrarenal abdominal aorta measuring  up to 2.6 x 3.0 cm, unchanged. No evidence of significant lymphadenopathy. New  esophageal tube tip terminating in the gastric body. Interval increase in  diffuse fluid-filled dilatation of the proximal small bowel which is not well  evaluated on this noncontrast examination, however there appears to be a sharp  transition point in the left pelvis into decompressed distal small bowel loops,  this finding likely represents worsening mechanical small bowel obstruction  secondary to adhesions or bands. No evidence of intraperitoneal free air.     PELVIS:  Normal-appearing urinary bladder. Punctate prostatic calcifications. Normal-appearing seminal vesicles. Sigmoid diverticula. Normal-appearing  proximal colon. Increased trace pelvic free fluid. No evidence of significant  inguinal or pelvic sidewall lymphadenopathy. Chronic healed fracture  deformities of the right superior pubic ramus and inferior pubic ramus appears  similar to prior. Visualized osseous structures otherwise unremarkable.     IMPRESSION:      1. Apparent interval worsening of mechanical small bowel obstruction with sharp  transition point in the left pelvis, likely secondary to adhesions or bands. 2.  Increased trace pelvic ascites, with a new small right pleural effusion.   3.  Other chronic findings as above.                    Additional hx:  6/15/19 passing flatus; pain better  6/16/19 passing more flatus, no bm yet; some lower abd pain after getting up to BR earlier  6/17/19 passing more flatus; no BM yet; AF; mild lower abd pain  6/18/19 passing flatus; 1.2-1.8 Liters NGT dark output qday;  Mild lower abd pain; repeat CT is worse as above. To OR   6/19/19 POD1; no complaints  6/20/19 POD2; feels OK, no flatus, NGT 1550cc/24hrs          Past Medical History:   Diagnosis Date    Arteritis, unspecified ([facility] [address]     Asthma     Body mass index between 19-24, adult     Bronchiolitis     CA in situ skin     Chronic airway obstruction, not elsewhere classified 5/26/2015    Coccyx disorder     Colon polyps     Crushing injury     Dermatitis     dermatitis/urticaria    ED (erectile dysfunction)     Esophageal reflux 5/26/2015    Herpes zoster     Impotence of organic origin     Lumbar back pain     Pain in limb     Reflux esophagitis     Skin disorder     Tobacco use disorder 5/26/2015     Past Surgical History:   Procedure Laterality Date    HX HEART CATHETERIZATION  1999    HX HEMORRHOIDECTOMY  1999     Current Facility-Administered Medications   Medication Dose Route Frequency    budesonide-formoterol (SYMBICORT) 160-4.5 mcg/actuation HFA inhaler 2 Puff (Patient Supplied)  2 Puff Inhalation BID PRN    pantoprazole (PROTONIX) 40 mg in sodium chloride 0.9% 10 mL injection  40 mg IntraVENous Q12H    lactated Ringers infusion  200 mL/hr IntraVENous CONTINUOUS    enoxaparin (LOVENOX) injection 40 mg  40 mg SubCUTAneous Q24H    acetaminophen (TYLENOL) tablet 1,000 mg  1,000 mg Oral Q6H PRN    ondansetron (ZOFRAN) injection 4 mg  4 mg IntraVENous Q4H PRN    morphine 10 mg/ml injection 2-4 mg  2-4 mg IntraVENous Q1H PRN    phenol throat spray (CHLORASEPTIC) 1 Spray  1 Spray Oral PRN     Patient has no known allergies.   Social History     Socioeconomic History    Marital status:      Spouse name: Not on file    Number of children: Not on file    Years of education: Not on file    Highest education level: Not on file   Tobacco Use    Smoking status: Current Every Day Smoker     Packs/day: 1.00     Years: 42.00     Pack years: 42.00    Smokeless tobacco: Former User     Quit date: 1/1/1991   Substance and Sexual Activity    Alcohol use: No    Drug use: No     Social History     Tobacco Use   Smoking Status Current Every Day Smoker    Packs/day: 1.00    Years: 42.00    Pack years: 42.00   Smokeless Tobacco Former User    Quit date: 1/1/1991     Family History   Problem Relation Age of Onset    Stroke Father         CVA    Heart Disease Father     Cancer Mother         Lung     ROS: The patient has no difficulty with chest pain or shortness of breath. No fever or chills. Comprehensive review of systems was otherwise unremarkable except as noted above. Physical Exam:   Visit Vitals  /73   Pulse (!) 58   Temp 98.2 °F (36.8 °C)   Resp 17   Ht 6' 1\" (1.854 m)   Wt 177 lb (80.3 kg)   SpO2 92%   BMI 23.35 kg/m²     Vitals:    06/20/19 2030 06/20/19 2327 06/21/19 0427 06/21/19 0710   BP: 155/73 152/72 138/73 142/73   Pulse: (!) 57 (!) 59 (!) 57 (!) 58   Resp: 16 17 17 17   Temp: 98.1 °F (36.7 °C) 98.4 °F (36.9 °C) 97.5 °F (36.4 °C) 98.2 °F (36.8 °C)   SpO2: 94% 91% 92% 92%   Weight:       Height:         No intake/output data recorded. 06/19 1901 - 06/21 0700  In: 3875 [I.V.:3875]  Out: 5650 [Urine:3300]    Constitutional: Alert, oriented, cooperative patient in no acute distress; appears stated age    Eyes:Sclera are clear. EOMs intact  ENMT: no external lesions gross hearing normal; no obvious neck masses, no ear or lip lesions, nares normal; +NGT  CV: RRR. Normal perfusion  Resp: No JVD. Breathing is  non-labored; no audible wheezing. GI: dressing dry, Incision clear, no cellulitis       Musculoskeletal: unremarkable with normal function. No embolic signs or cyanosis.    Neuro: Oriented; moves all 4; no focal deficits  Psychiatric: normal affect and mood, no memory impairment    Recent vitals (if inpt):  Patient Vitals for the past 24 hrs:   BP Temp Pulse Resp SpO2   06/21/19 0710 142/73 98.2 °F (36.8 °C) (!) 58 17 92 %   06/21/19 0427 138/73 97.5 °F (36.4 °C) (!) 57 17 92 %   06/20/19 2327 152/72 98.4 °F (36.9 °C) (!) 59 17 91 %   06/20/19 2030 155/73 98.1 °F (36.7 °C) (!) 57 16 94 %   06/20/19 1504 140/77 98.8 °F (37.1 °C) (!) 54 14 94 %   06/20/19 1135 148/72 98.5 °F (36.9 °C) (!) 56 16 95 %   06/20/19 0952     92 %       Labs:  Recent Labs     06/21/19  0429  06/19/19  0315   WBC 9.5   < > 11.6*   HGB 12.2*   < > 13.0*      < > 234      < > 140   K 3.8   < > 4.3      < > 103   CO2 33*   < > 29   BUN 13   < > 24*   CREA 0.76*   < > 0.93   GLU 79   < > 130*   TBILI  --   --  0.7   SGOT  --   --  41*   ALT  --   --  29   AP  --   --  69    < > = values in this interval not displayed. Lab Results   Component Value Date/Time    WBC 9.5 06/21/2019 04:29 AM    HGB 12.2 (L) 06/21/2019 04:29 AM    PLATELET 049 49/64/1806 04:29 AM    Sodium 140 06/21/2019 04:29 AM    Potassium 3.8 06/21/2019 04:29 AM    Chloride 101 06/21/2019 04:29 AM    CO2 33 (H) 06/21/2019 04:29 AM    BUN 13 06/21/2019 04:29 AM    Creatinine 0.76 (L) 06/21/2019 04:29 AM    Glucose 79 06/21/2019 04:29 AM    Bilirubin, total 0.7 06/19/2019 03:15 AM    AST (SGOT) 41 (H) 06/19/2019 03:15 AM    ALT (SGPT) 29 06/19/2019 03:15 AM    Alk. phosphatase 69 06/19/2019 03:15 AM       CT Results  (Last 48 hours)    None        chest X-ray      I reviewed recent labs, recent radiologic studies, and pertinent records including other doctor notes if needed. I independently reviewed radiology images for studies I described above or studies I have ordered.    Admission date (for inpatients): 6/13/2019   * No surgery found *  Procedure(s):  LAPAROTOMY EXPLORATORY REDUCTION OF INTERNAL HERNIA / EXCISION OF MESENTERIC MASS    ASSESSMENT/PLAN:  Problem List  Date Reviewed: 6/13/2019          Codes Class Noted    Oliguria ICD-10-CM: R34  ICD-9-CM: 788.5  6/18/2019        Bengn Mesenteric mass ICD-10-CM: K63.9  ICD-9-CM: 568.89  6/18/2019    Overview Signed 6/20/2019  6:13 PM by Magy Penny MD     DIAGNOSIS  MESENTERIC MASS: BENIGN FIBROADIPOSE TISSUE, CONSISTENT WITH LIPOMA. Electronically signed out on 6/20/2019 10:56 by Ruby Love. Estrella Logan MD              Internal hernia ICD-10-CM: W60.2  ICD-9-CM: 553.8  6/18/2019        3.2cm AAA on CT 6/13/19 ICD-10-CM: I71.4  ICD-9-CM: 441.4  6/14/2019        Hiatal hernia ICD-10-CM: K44.9  ICD-9-CM: 553.3  6/14/2019        Generalized abdominal pain ICD-10-CM: R10.84  ICD-9-CM: 789.07  6/13/2019        * (Principal) Small Bowel Enteritis on CT ICD-10-CM: K52.9  ICD-9-CM: 558.9  6/13/2019        [facility] obstruction) ([facility] ICD-10-CM: Z16.457  ICD-9-CM: 560.9  6/13/2019    Overview Signed 6/18/2019  5:26 PM by Magy Penny MD     6/18/19 s/p Expl lap for refractory SBO; Dr Madeleine Roa             Leukocytosis ICD-10-CM: C30.467  ICD-9-CM: 288.60  6/13/2019        Tobacco use disorder ICD-10-CM: S82.720  ICD-9-CM: 305.1  5/26/2015        Esophageal reflux ICD-10-CM: K21.9  ICD-9-CM: 530.81  5/26/2015        Chronic airway obstruction, not elsewhere classified ICD-10-CM: J44.9  ICD-9-CM: 528  5/26/2015            Principal Problem:    Small Bowel Enteritis on CT (6/13/2019)    Active Problems:    Tobacco use disorder (5/26/2015)      Generalized abdominal pain (6/13/2019)      SBO (small bowel obstruction) ([address] (6/13/2019)      Overview: 6/18/19 s/p Expl lap for refractory SBO; Dr Madeleine Roa      Leukocytosis (6/13/2019)      3.2cm AAA on CT 6/13/19 (6/14/2019)      Hiatal hernia (6/14/2019)      Oliguria (6/18/2019)      Bengn Mesenteric mass (6/18/2019)      Overview: DIAGNOSIS  MESENTERIC MASS: BENIGN FIBROADIPOSE TISSUE, CONSISTENT WITH       LIPOMA. Electronically signed out on 6/20/2019 10:56 by Ami Castano. Mitch Figueredo MD       Internal hernia (6/18/2019)         SBO  He is s/p open reduction of closed loop obstruction from internal hernia 6/18/19 which caused a refractory SBO. Await bowel recovery, NGT out when flatus and its output is much less than it is now 1550cc/24hrs    May take awhile with swelling and ischemia noted intra-op  NPO/IVF/Bowel rest      GI will likely perform colonoscopy as outpt after acute illness resolves      Leukocytosis-->resolved post-op    3.2cm AAA on CT 6/13/19  Refer to vascular surgery as outpt for follow-up    Hiatal hernia on CT 6/13/19  Asymptomatic, no surgery planned    Benign Mesenteric mass unrelated to SBO  DIAGNOSIS  MESENTERIC MASS: BENIGN FIBROADIPOSE TISSUE, CONSISTENT WITH LIPOMA. Electronically signed out on 6/20/2019 10:56 by Ami Castano. Mitch Figueredo MD           I have personally performed a face-to-face diagnostic evaluation and management  service on this patient. I have independently seen the patient. I have independently obtained the above history from the patient/family. I have independently examined the patient with above findings. I have independently reviewed data/labs for this patient and developed the above plan of care (MDM). Signed: Guillermina Hyman.  Fior Mcmullen MD, FACS

## 2019-06-22 LAB
ANION GAP SERPL CALC-SCNC: 7 MMOL/L (ref 7–16)
BUN SERPL-MCNC: 9 MG/DL (ref 8–23)
CALCIUM SERPL-MCNC: 8 MG/DL (ref 8.3–10.4)
CHLORIDE SERPL-SCNC: 105 MMOL/L (ref 98–107)
CO2 SERPL-SCNC: 28 MMOL/L (ref 21–32)
CREAT SERPL-MCNC: 0.66 MG/DL (ref 0.8–1.5)
ERYTHROCYTE [DISTWIDTH] IN BLOOD BY AUTOMATED COUNT: 12.4 % (ref 11.9–14.6)
GLUCOSE SERPL-MCNC: 117 MG/DL (ref 65–100)
HCT VFR BLD AUTO: 40.4 % (ref 41.1–50.3)
HGB BLD-MCNC: 13 G/DL (ref 13.6–17.2)
MCH RBC QN AUTO: 30.7 PG (ref 26.1–32.9)
MCHC RBC AUTO-ENTMCNC: 32.2 G/DL (ref 31.4–35)
MCV RBC AUTO: 95.3 FL (ref 79.6–97.8)
NRBC # BLD: 0 K/UL (ref 0–0.2)
PLATELET # BLD AUTO: 246 K/UL (ref 150–450)
PMV BLD AUTO: 10.7 FL (ref 9.4–12.3)
POTASSIUM SERPL-SCNC: 3.9 MMOL/L (ref 3.5–5.1)
RBC # BLD AUTO: 4.24 M/UL (ref 4.23–5.6)
SODIUM SERPL-SCNC: 140 MMOL/L (ref 136–145)
WBC # BLD AUTO: 7.6 K/UL (ref 4.3–11.1)

## 2019-06-22 PROCEDURE — 36415 COLL VENOUS BLD VENIPUNCTURE: CPT

## 2019-06-22 PROCEDURE — C9113 INJ PANTOPRAZOLE SODIUM, VIA: HCPCS | Performed by: SURGERY

## 2019-06-22 PROCEDURE — 74011250636 HC RX REV CODE- 250/636: Performed by: SURGERY

## 2019-06-22 PROCEDURE — 65270000029 HC RM PRIVATE

## 2019-06-22 PROCEDURE — 74011000250 HC RX REV CODE- 250: Performed by: SURGERY

## 2019-06-22 PROCEDURE — 85027 COMPLETE CBC AUTOMATED: CPT

## 2019-06-22 PROCEDURE — 80048 BASIC METABOLIC PNL TOTAL CA: CPT

## 2019-06-22 RX ADMIN — SODIUM CHLORIDE 40 MG: 9 INJECTION, SOLUTION INTRAMUSCULAR; INTRAVENOUS; SUBCUTANEOUS at 20:05

## 2019-06-22 RX ADMIN — ENOXAPARIN SODIUM 40 MG: 40 INJECTION SUBCUTANEOUS at 09:36

## 2019-06-22 RX ADMIN — MORPHINE SULFATE 2 MG: 10 INJECTION, SOLUTION INTRAMUSCULAR; INTRAVENOUS at 10:04

## 2019-06-22 RX ADMIN — SODIUM CHLORIDE 40 MG: 9 INJECTION, SOLUTION INTRAMUSCULAR; INTRAVENOUS; SUBCUTANEOUS at 09:36

## 2019-06-22 RX ADMIN — MORPHINE SULFATE 4 MG: 10 INJECTION, SOLUTION INTRAMUSCULAR; INTRAVENOUS at 00:23

## 2019-06-22 RX ADMIN — MORPHINE SULFATE 4 MG: 10 INJECTION, SOLUTION INTRAMUSCULAR; INTRAVENOUS at 23:30

## 2019-06-22 RX ADMIN — DEXTROSE MONOHYDRATE, SODIUM CHLORIDE, AND POTASSIUM CHLORIDE 150 ML/HR: 50; 4.5; 1.49 INJECTION, SOLUTION INTRAVENOUS at 00:24

## 2019-06-22 RX ADMIN — DEXTROSE MONOHYDRATE, SODIUM CHLORIDE, AND POTASSIUM CHLORIDE 150 ML/HR: 50; 4.5; 1.49 INJECTION, SOLUTION INTRAVENOUS at 23:29

## 2019-06-22 RX ADMIN — DEXTROSE MONOHYDRATE, SODIUM CHLORIDE, AND POTASSIUM CHLORIDE 150 ML/HR: 50; 4.5; 1.49 INJECTION, SOLUTION INTRAVENOUS at 05:41

## 2019-06-22 NOTE — PROGRESS NOTES
H&P/Consult Note/Progress Note/Office Note:   Luis Martell  MRN: [id number]  :1955  Age:63 y.o.    HPI: Luis Martell is a 58 y.o. male who is s/p open reduction of closed loop obstruction from internal hernia 19 which caused a refractory SBO. Prior to surgery he was admitted from the ER on 19 after presenting with   a 1 day h/o progressive, constant, severe, non-radiating, generalized abd pain. Nothing made pain better or worse. No associated N/V/F/C or diarrhea  Normally has 1-2 solid BMs qday but lately BM only every other day  No h/o bloody diarrhea or sign diarrhea issues    WBC 12.7k  Surgery consulted after CT showed acute enteritis with component of underlying obstrauction  No h/o Crohn's or lymphoma  He is s/p appy in 19 CT abd/pelvis with PO and IV contrast  - Liver: Within normal limits. - Gallbladder and bile ducts: Within normal limits. - Spleen: Within normal limits. - Urinary tract: Within normal limits. - Adrenals: Within normal limits. - Pancreas: Within normal limits. - Gastrointestinal tract: There is an approximate 15 cm long segment of small  bowel wall thickening in the pelvis, in the region of the distal ileum, with  mesenteric hyperemia and trace free pelvic fluid. Findings are consistent with  acute enteritis. The more proximal small bowel loops are mildly distended,  suggesting a component of underlying obstruction as well. The terminal ileum has  a normal appearance. The colon is unremarkable. The appendix is not definitely  identified. No focal abscess or free air is identified.  - Retroperitoneum: There is a 3.2 cm fusiform infrarenal abdominal aortic  aneurysm, without evidence of dissection or rupture. - Peritoneal cavity and abdominal wall: Within normal limits. - Pelvis: Within normal limits. - Spine/bones: No acute process. - Other comments: The lung bases are clear. There is a small hiatal hernia. IMPRESSION:   1. Acute enteritis involving an approximately 15 cm long segment of pelvic small  bowel, with an associated mild small bowel obstruction. There is trace free  fluid in the pelvis as well.  2. 3.2 cm infrarenal abdominal aortic aneurysm. 3. Small hiatal hernia        6/18/19 CT abd/pevls without oral or IV contrast    ABD:  New small right pleural effusion with associated mild dependent subsegmental  atelectasis bilateral lung bases. Normal-appearing liver, gallbladder, spleen,  pancreas, bilateral adrenal glands and kidneys. Moderate calcific  atherosclerosis of a borderline aneurysmal infrarenal abdominal aorta measuring  up to 2.6 x 3.0 cm, unchanged. No evidence of significant lymphadenopathy. New  esophageal tube tip terminating in the gastric body. Interval increase in  diffuse fluid-filled dilatation of the proximal small bowel which is not well  evaluated on this noncontrast examination, however there appears to be a sharp  transition point in the left pelvis into decompressed distal small bowel loops,  this finding likely represents worsening mechanical small bowel obstruction  secondary to adhesions or bands. No evidence of intraperitoneal free air.     PELVIS:  Normal-appearing urinary bladder. Punctate prostatic calcifications. Normal-appearing seminal vesicles. Sigmoid diverticula. Normal-appearing  proximal colon. Increased trace pelvic free fluid. No evidence of significant  inguinal or pelvic sidewall lymphadenopathy. Chronic healed fracture  deformities of the right superior pubic ramus and inferior pubic ramus appears  similar to prior. Visualized osseous structures otherwise unremarkable.     IMPRESSION:      1. Apparent interval worsening of mechanical small bowel obstruction with sharp  transition point in the left pelvis, likely secondary to adhesions or bands. 2.  Increased trace pelvic ascites, with a new small right pleural effusion.   3.  Other chronic findings as above.                    Additional hx:  6/15/19 passing flatus; pain better  6/16/19 passing more flatus, no bm yet; some lower abd pain after getting up to BR earlier  6/17/19 passing more flatus; no BM yet; AF; mild lower abd pain  6/18/19 passing flatus; 1.2-1.8 Liters NGT dark output qday;  Mild lower abd pain; repeat CT is worse as above. To OR   6/19/19 POD1; no complaints  6/20/19 POD2; feels OK, no flatus, NGT 1550cc/24hrs  6/22/19 POD4; No complaints; feels OK, no flatus, NGT 1500cc/24hrs.         Past Medical History:   Diagnosis Date    Arteritis, unspecified ([facility] [address]     Asthma     Body mass index between 19-24, adult     Bronchiolitis     CA in situ skin     Chronic airway obstruction, not elsewhere classified 5/26/2015    Coccyx disorder     Colon polyps     Crushing injury     Dermatitis     dermatitis/urticaria    ED (erectile dysfunction)     Esophageal reflux 5/26/2015    Herpes zoster     Impotence of organic origin     Lumbar back pain     Pain in limb     Reflux esophagitis     Skin disorder     Tobacco use disorder 5/26/2015     Past Surgical History:   Procedure Laterality Date    HX HEART CATHETERIZATION  1999    HX HEMORRHOIDECTOMY  1999     Current Facility-Administered Medications   Medication Dose Route Frequency    dextrose 5% - 0.45% NaCl with KCl 20 mEq/L infusion  150 mL/hr IntraVENous CONTINUOUS    budesonide-formoterol (SYMBICORT) 160-4.5 mcg/actuation HFA inhaler 2 Puff (Patient Supplied)  2 Puff Inhalation BID PRN    pantoprazole (PROTONIX) 40 mg in sodium chloride 0.9% 10 mL injection  40 mg IntraVENous Q12H    enoxaparin (LOVENOX) injection 40 mg  40 mg SubCUTAneous Q24H    acetaminophen (TYLENOL) tablet 1,000 mg  1,000 mg Oral Q6H PRN    ondansetron (ZOFRAN) injection 4 mg  4 mg IntraVENous Q4H PRN    morphine 10 mg/ml injection 2-4 mg  2-4 mg IntraVENous Q1H PRN    phenol throat spray (CHLORASEPTIC) 1 Spray  1 Spray Oral PRN     Patient has no known allergies. Social History     Socioeconomic History    Marital status:      Spouse name: Not on file    Number of children: Not on file    Years of education: Not on file    Highest education level: Not on file   Tobacco Use    Smoking status: Current Every Day Smoker     Packs/day: 1.00     Years: 42.00     Pack years: 42.00    Smokeless tobacco: Former User     Quit date: 1/1/1991   Substance and Sexual Activity    Alcohol use: No    Drug use: No     Social History     Tobacco Use   Smoking Status Current Every Day Smoker    Packs/day: 1.00    Years: 42.00    Pack years: 42.00   Smokeless Tobacco Former User    Quit date: 1/1/1991     Family History   Problem Relation Age of Onset    Stroke Father         CVA    Heart Disease Father     Cancer Mother         Lung     ROS: The patient has no difficulty with chest pain or shortness of breath. No fever or chills. Comprehensive review of systems was otherwise unremarkable except as noted above. Physical Exam:   Visit Vitals  /73   Pulse 94   Temp 97.7 °F (36.5 °C)   Resp 17   Ht 6' 1\" (1.854 m)   Wt 177 lb (80.3 kg)   SpO2 95%   BMI 23.35 kg/m²     Vitals:    06/21/19 1929 06/21/19 2335 06/22/19 0321 06/22/19 0731   BP: 146/82 147/77 133/72 142/73   Pulse: (!) 57 (!) 55 (!) 51 94   Resp:  17 17 17   Temp: 99.1 °F (37.3 °C) 99.3 °F (37.4 °C) 98.8 °F (37.1 °C) 97.7 °F (36.5 °C)   SpO2: 94% 95%     Weight:       Height:         06/22 0701 - 06/22 1900  In: -   Out: 700   06/20 1901 - 06/22 0700  In: -   Out: 8250 [Urine:6050]    Constitutional: Alert, oriented, cooperative patient in no acute distress; appears stated age    Eyes:Sclera are clear. EOMs intact  ENMT: no external lesions gross hearing normal; no obvious neck masses, no ear or lip lesions, nares normal; +NGT  CV: RRR. Normal perfusion  Resp: No JVD. Breathing is  non-labored; no audible wheezing.       GI: dressing dry, Incision clear, no cellulitis       Musculoskeletal: unremarkable with normal function. No embolic signs or cyanosis. Neuro:  Oriented; moves all 4; no focal deficits  Psychiatric: normal affect and mood, no memory impairment    Recent vitals (if inpt):  Patient Vitals for the past 24 hrs:   BP Temp Pulse Resp SpO2   06/22/19 0731 142/73 97.7 °F (36.5 °C) 94 17    06/22/19 0321 133/72 98.8 °F (37.1 °C) (!) 51 17    06/21/19 2335 147/77 99.3 °F (37.4 °C) (!) 55 17 95 %   06/21/19 1929 146/82 99.1 °F (37.3 °C) (!) 57  94 %   06/21/19 1507 156/81 98.2 °F (36.8 °C) 60 17    06/21/19 1213 143/85 98.2 °F (36.8 °C) 63 17        Labs:  Recent Labs     06/22/19  0540   WBC 7.6   HGB 13.0*         K 3.9      CO2 28   BUN 9   CREA 0.66*   *       Lab Results   Component Value Date/Time    WBC 7.6 06/22/2019 05:40 AM    HGB 13.0 (L) 06/22/2019 05:40 AM    PLATELET 833 27/32/9552 05:40 AM    Sodium 140 06/22/2019 05:40 AM    Potassium 3.9 06/22/2019 05:40 AM    Chloride 105 06/22/2019 05:40 AM    CO2 28 06/22/2019 05:40 AM    BUN 9 06/22/2019 05:40 AM    Creatinine 0.66 (L) 06/22/2019 05:40 AM    Glucose 117 (H) 06/22/2019 05:40 AM    Bilirubin, total 0.7 06/19/2019 03:15 AM    AST (SGOT) 41 (H) 06/19/2019 03:15 AM    ALT (SGPT) 29 06/19/2019 03:15 AM    Alk. phosphatase 69 06/19/2019 03:15 AM       CT Results  (Last 48 hours)    None        chest X-ray      I reviewed recent labs, recent radiologic studies, and pertinent records including other doctor notes if needed. I independently reviewed radiology images for studies I described above or studies I have ordered.    Admission date (for inpatients): 6/13/2019   * No surgery found *  Procedure(s):  LAPAROTOMY EXPLORATORY REDUCTION OF INTERNAL HERNIA / EXCISION OF MESENTERIC MASS    ASSESSMENT/PLAN:  Problem List  Date Reviewed: 6/13/2019          Codes Class Noted    Oliguria ICD-10-CM: R34  ICD-9-CM: 788.5  6/18/2019        Bengn Mesenteric mass ICD-10-CM: K63.9  ICD-9-CM: 568.89  6/18/2019 Overview Signed 6/20/2019  6:13 PM by Lavelle Hair MD     DIAGNOSIS  MESENTERIC MASS: BENIGN FIBROADIPOSE TISSUE, CONSISTENT WITH LIPOMA. Electronically signed out on 6/20/2019 10:56 by Tanya Lab. Gilbert Golden MD              Internal hernia ICD-10-CM: Z93.2  ICD-9-CM: 553.8  6/18/2019        3.2cm AAA on CT 6/13/19 ICD-10-CM: I71.4  ICD-9-CM: 441.4  6/14/2019        Hiatal hernia ICD-10-CM: K44.9  ICD-9-CM: 553.3  6/14/2019        Generalized abdominal pain ICD-10-CM: R10.84  ICD-9-CM: 789.07  6/13/2019        * (Principal) Small Bowel Enteritis on CT ICD-10-CM: K52.9  ICD-9-CM: 558.9  6/13/2019        SBO (small bowel obstruction) ([facility] ICD-10-CM: C73.570  ICD-9-CM: 560.9  6/13/2019    Overview Signed 6/18/2019  5:26 PM by Lavelle Hair MD     6/18/19 s/p Expl lap for refractory SBO; Dr Parvez Lorenz             Leukocytosis ICD-10-CM: F86.146  ICD-9-CM: 288.60  6/13/2019        Tobacco use disorder ICD-10-CM: X57.898  ICD-9-CM: 305.1  5/26/2015        Esophageal reflux ICD-10-CM: K21.9  ICD-9-CM: 530.81  5/26/2015        Chronic airway obstruction, not elsewhere classified ICD-10-CM: J44.9  ICD-9-CM: 430  5/26/2015            Principal Problem:    Small Bowel Enteritis on CT (6/13/2019)    Active Problems:    Tobacco use disorder (5/26/2015)      Generalized abdominal pain (6/13/2019)      [facility]BO (small bowel obstruction) ([facility] (6/13/2019)      Overview: 6/18/19 s/p Expl lap for refractory SBO; Dr Parvez Lorenz      Leukocytosis (6/13/2019)      3.2cm AAA on CT 6/13/19 (6/14/2019)      Hiatal hernia (6/14/2019)      Oliguria (6/18/2019)      Bengn Mesenteric mass (6/18/2019)      Overview: DIAGNOSIS  MESENTERIC MASS: BENIGN FIBROADIPOSE TISSUE, CONSISTENT WITH       LIPOMA. Electronically signed out on 6/20/2019 10:56 by Tanya Lab.  Gilbert Golden MD       Internal hernia (6/18/2019)         SBO  He is s/p open reduction of closed loop obstruction from internal hernia 6/18/19 which caused a refractory SBO. Await bowel recovery, NGT out when flatus and its output is much less than it is now 1500cc/24hrs    May take awhile with swelling and ischemia noted intra-op  NPO/IVF/Bowel rest    GI will likely perform colonoscopy as outpt after acute illness resolves      Leukocytosis-->resolved post-op    3.2cm AAA on CT 6/13/19  Refer to vascular surgery as outpt for follow-up    Hiatal hernia on CT 6/13/19  Asymptomatic, no surgery planned    Benign Mesenteric mass unrelated to SBO  DIAGNOSIS  MESENTERIC MASS: BENIGN FIBROADIPOSE TISSUE, CONSISTENT WITH LIPOMA. Electronically signed out on 6/20/2019 10:56 by Robert Amezcua.  MD Gayle Posey NP

## 2019-06-22 NOTE — PROGRESS NOTES
END OF SHIFT NOTE:    INTAKE/OUTPUT  06/21 0701 - 06/22 0700  In: -   Out: 5900 [AIIQY:7312]  Voiding: YES  Catheter: NO  Drain:   Nasogastric Tube 06/13/19 (Active)   Site Assessment Clean, dry, & intact 6/22/2019  4:23 PM   Securement Device Tape 6/22/2019  4:23 PM   G Port Status Intermittent Suction 6/22/2019  4:23 PM   External Insertion Simone (cms) 78 cms 6/21/2019  4:00 AM   Action Taken Retaped 6/21/2019  2:37 PM   Drainage Description Ifeoma Denney 6/22/2019  4:23 PM   Drainage Chamber Level (ml) 0 ml 6/22/2019  6:32 PM   Output (ml) 650 ml 6/22/2019  6:32 PM               Flatus: Patient does not have flatus present. Stool:  0 occurrences. Characteristics:       Emesis: 0 occurrences. Characteristics:        VITAL SIGNS  Patient Vitals for the past 12 hrs:   Temp Pulse Resp BP SpO2   06/22/19 1517 97.9 °F (36.6 °C)  18 119/78 96 %   06/22/19 1118 98.7 °F (37.1 °C)  17 144/73 96 %   06/22/19 0731 97.7 °F (36.5 °C) 94 17 142/73        Pain Assessment  Pain Intensity 1: 0 (06/22/19 1623)  Pain Location 1: Abdomen  Pain Intervention(s) 1: Medication (see MAR)  Patient Stated Pain Goal: 0    Ambulating  Yes with assistance. Family ambulated patient twice this shift. Patient completed a total of 6 laps. Patient c/o pain once this shift and received morphine. Shift report given to oncoming nurse at the bedside.     Debra Hunt RN

## 2019-06-22 NOTE — PROGRESS NOTES
END OF SHIFT NOTE:    INTAKE/OUTPUT  06/21 0701 - 06/22 0700  In: -   Out: 5900 [[id number]]  Voiding: YES  Catheter: NO  Drain:   Nasogastric Tube 06/13/19 (Active)   Site Assessment Clean, dry, & intact 6/22/2019 12:28 AM   Securement Device Tape 6/22/2019 12:28 AM   G Port Status Intermittent Suction 6/22/2019 12:28 AM   External Insertion Simone (cms) 78 cms 6/21/2019  4:00 AM   Action Taken Retaped 6/21/2019  2:37 PM   Drainage Description Landry Ortizers 6/22/2019 12:28 AM   Drainage Chamber Level (ml) 600 ml 6/22/2019  5:41 AM   Output (ml) 300 ml 6/22/2019  5:41 AM               Flatus: Patient does not have flatus present. Stool:  0 occurrences. Characteristics:       Emesis: 0 occurrences. Characteristics:        VITAL SIGNS  Patient Vitals for the past 12 hrs:   Temp Pulse Resp BP SpO2   06/22/19 0321 98.8 °F (37.1 °C) (!) 51 17 133/72    06/21/19 2335 99.3 °F (37.4 °C) (!) 55 17 147/77 95 %   06/21/19 1929 99.1 °F (37.3 °C) (!) 57  146/82 94 %       Pain Assessment  Pain Intensity 1: 0 (06/22/19 0119)  Pain Location 1: Abdomen  Pain Intervention(s) 1: Medication (see MAR)  Patient Stated Pain Goal: 0    Ambulating  No    Shift report given to oncoming nurse at the bedside.     Jeimy Peters RN

## 2019-06-23 LAB
ANION GAP SERPL CALC-SCNC: 6 MMOL/L (ref 7–16)
BUN SERPL-MCNC: 7 MG/DL (ref 8–23)
CALCIUM SERPL-MCNC: 8.6 MG/DL (ref 8.3–10.4)
CHLORIDE SERPL-SCNC: 107 MMOL/L (ref 98–107)
CO2 SERPL-SCNC: 29 MMOL/L (ref 21–32)
CREAT SERPL-MCNC: 0.67 MG/DL (ref 0.8–1.5)
ERYTHROCYTE [DISTWIDTH] IN BLOOD BY AUTOMATED COUNT: 12.2 % (ref 11.9–14.6)
GLUCOSE SERPL-MCNC: 114 MG/DL (ref 65–100)
HCT VFR BLD AUTO: 40.6 % (ref 41.1–50.3)
HGB BLD-MCNC: 13.1 G/DL (ref 13.6–17.2)
MCH RBC QN AUTO: 29.9 PG (ref 26.1–32.9)
MCHC RBC AUTO-ENTMCNC: 32.3 G/DL (ref 31.4–35)
MCV RBC AUTO: 92.7 FL (ref 79.6–97.8)
NRBC # BLD: 0 K/UL (ref 0–0.2)
PLATELET # BLD AUTO: 285 K/UL (ref 150–450)
PMV BLD AUTO: 11.4 FL (ref 9.4–12.3)
POTASSIUM SERPL-SCNC: 3.9 MMOL/L (ref 3.5–5.1)
RBC # BLD AUTO: 4.38 M/UL (ref 4.23–5.6)
SODIUM SERPL-SCNC: 142 MMOL/L (ref 136–145)
WBC # BLD AUTO: 7.8 K/UL (ref 4.3–11.1)

## 2019-06-23 PROCEDURE — 74011000250 HC RX REV CODE- 250: Performed by: SURGERY

## 2019-06-23 PROCEDURE — C9113 INJ PANTOPRAZOLE SODIUM, VIA: HCPCS | Performed by: SURGERY

## 2019-06-23 PROCEDURE — 74011250636 HC RX REV CODE- 250/636: Performed by: SURGERY

## 2019-06-23 PROCEDURE — 85027 COMPLETE CBC AUTOMATED: CPT

## 2019-06-23 PROCEDURE — 80048 BASIC METABOLIC PNL TOTAL CA: CPT

## 2019-06-23 PROCEDURE — 36415 COLL VENOUS BLD VENIPUNCTURE: CPT

## 2019-06-23 PROCEDURE — 65270000029 HC RM PRIVATE

## 2019-06-23 RX ADMIN — SODIUM CHLORIDE 40 MG: 9 INJECTION, SOLUTION INTRAMUSCULAR; INTRAVENOUS; SUBCUTANEOUS at 07:35

## 2019-06-23 RX ADMIN — DEXTROSE MONOHYDRATE, SODIUM CHLORIDE, AND POTASSIUM CHLORIDE 150 ML/HR: 50; 4.5; 1.49 INJECTION, SOLUTION INTRAVENOUS at 19:23

## 2019-06-23 RX ADMIN — ENOXAPARIN SODIUM 40 MG: 40 INJECTION SUBCUTANEOUS at 07:34

## 2019-06-23 RX ADMIN — MORPHINE SULFATE 4 MG: 10 INJECTION, SOLUTION INTRAMUSCULAR; INTRAVENOUS at 10:46

## 2019-06-23 RX ADMIN — DEXTROSE MONOHYDRATE, SODIUM CHLORIDE, AND POTASSIUM CHLORIDE 150 ML/HR: 50; 4.5; 1.49 INJECTION, SOLUTION INTRAVENOUS at 12:44

## 2019-06-23 RX ADMIN — DEXTROSE MONOHYDRATE, SODIUM CHLORIDE, AND POTASSIUM CHLORIDE 150 ML/HR: 50; 4.5; 1.49 INJECTION, SOLUTION INTRAVENOUS at 05:10

## 2019-06-23 RX ADMIN — MORPHINE SULFATE 4 MG: 10 INJECTION, SOLUTION INTRAMUSCULAR; INTRAVENOUS at 22:59

## 2019-06-23 RX ADMIN — SODIUM CHLORIDE 40 MG: 9 INJECTION, SOLUTION INTRAMUSCULAR; INTRAVENOUS; SUBCUTANEOUS at 20:13

## 2019-06-23 NOTE — PROGRESS NOTES
END OF SHIFT NOTE:    INTAKE/OUTPUT  06/22 0701 - 06/23 0700  In: -   Out: 4200 [Urine:2450]  Voiding: YES  Catheter: NO  Drain:   Nasogastric Tube 06/13/19 (Active)   Site Assessment Clean, dry, & intact 6/23/2019 12:20 AM   Securement Device Tape 6/23/2019 12:20 AM   G Port Status Intermittent Suction 6/23/2019 12:20 AM   External Insertion Simone (cms) 78 cms 6/21/2019  4:00 AM   Action Taken Retaped 6/21/2019  2:37 PM   Drainage Description Leandrew Fly 6/23/2019 12:20 AM   Drainage Chamber Level (ml) 400 ml 6/23/2019  5:11 AM   Output (ml) 150 ml 6/23/2019  5:11 AM               Flatus: Patient does not have flatus present. Stool:  0 occurrences. Characteristics:       Emesis: 0 occurrences. Characteristics:        VITAL SIGNS  Patient Vitals for the past 12 hrs:   Temp Pulse Resp BP SpO2   06/23/19 0312 97.7 °F (36.5 °C) 91 18 136/69 96 %   06/23/19 0025 97.8 °F (36.6 °C) (!) 59 17 128/76 94 %       Pain Assessment  Pain Intensity 1: 0 (06/23/19 0025)  Pain Location 1: Abdomen  Pain Intervention(s) 1: Medication (see MAR)  Patient Stated Pain Goal: 0    Ambulating  Yes    Shift report given to oncoming nurse at the bedside.     Rod Lancaster RN

## 2019-06-23 NOTE — PROGRESS NOTES
H&P/Consult Note/Progress Note/Office Note:   Tristin Garcia  MRN: [id number]  :1955  Age:63 y.o.    HPI: Tristin Garcia is a 58 y.o. male who is s/p open reduction of closed loop obstruction from internal hernia 19 which caused a refractory SBO. Prior to surgery he was admitted from the ER on 19 after presenting with   a 1 day h/o progressive, constant, severe, non-radiating, generalized abd pain. Nothing made pain better or worse. No associated N/V/F/C or diarrhea  Normally has 1-2 solid BMs qday but lately BM only every other day  No h/o bloody diarrhea or sign diarrhea issues    WBC 12.7k  Surgery consulted after CT showed acute enteritis with component of underlying obstrauction  No h/o Crohn's or lymphoma  He is s/p appy in 19 CT abd/pelvis with PO and IV contrast  - Liver: Within normal limits. - Gallbladder and bile ducts: Within normal limits. - Spleen: Within normal limits. - Urinary tract: Within normal limits. - Adrenals: Within normal limits. - Pancreas: Within normal limits. - Gastrointestinal tract: There is an approximate 15 cm long segment of small  bowel wall thickening in the pelvis, in the region of the distal ileum, with  mesenteric hyperemia and trace free pelvic fluid. Findings are consistent with  acute enteritis. The more proximal small bowel loops are mildly distended,  suggesting a component of underlying obstruction as well. The terminal ileum has  a normal appearance. The colon is unremarkable. The appendix is not definitely  identified. No focal abscess or free air is identified.  - Retroperitoneum: There is a 3.2 cm fusiform infrarenal abdominal aortic  aneurysm, without evidence of dissection or rupture. - Peritoneal cavity and abdominal wall: Within normal limits. - Pelvis: Within normal limits. - Spine/bones: No acute process. - Other comments: The lung bases are clear. There is a small hiatal hernia. IMPRESSION:   1. Acute enteritis involving an approximately 15 cm long segment of pelvic small  bowel, with an associated mild small bowel obstruction. There is trace free  fluid in the pelvis as well.  2. 3.2 cm infrarenal abdominal aortic aneurysm. 3. Small hiatal hernia        6/18/19 CT abd/pevls without oral or IV contrast    ABD:  New small right pleural effusion with associated mild dependent subsegmental  atelectasis bilateral lung bases. Normal-appearing liver, gallbladder, spleen,  pancreas, bilateral adrenal glands and kidneys. Moderate calcific  atherosclerosis of a borderline aneurysmal infrarenal abdominal aorta measuring  up to 2.6 x 3.0 cm, unchanged. No evidence of significant lymphadenopathy. New  esophageal tube tip terminating in the gastric body. Interval increase in  diffuse fluid-filled dilatation of the proximal small bowel which is not well  evaluated on this noncontrast examination, however there appears to be a sharp  transition point in the left pelvis into decompressed distal small bowel loops,  this finding likely represents worsening mechanical small bowel obstruction  secondary to adhesions or bands. No evidence of intraperitoneal free air.     PELVIS:  Normal-appearing urinary bladder. Punctate prostatic calcifications. Normal-appearing seminal vesicles. Sigmoid diverticula. Normal-appearing  proximal colon. Increased trace pelvic free fluid. No evidence of significant  inguinal or pelvic sidewall lymphadenopathy. Chronic healed fracture  deformities of the right superior pubic ramus and inferior pubic ramus appears  similar to prior. Visualized osseous structures otherwise unremarkable.     IMPRESSION:      1. Apparent interval worsening of mechanical small bowel obstruction with sharp  transition point in the left pelvis, likely secondary to adhesions or bands. 2.  Increased trace pelvic ascites, with a new small right pleural effusion.   3.  Other chronic findings as above.                    Additional hx:  6/15/19 passing flatus; pain better  6/16/19 passing more flatus, no bm yet; some lower abd pain after getting up to BR earlier  6/17/19 passing more flatus; no BM yet; AF; mild lower abd pain  6/18/19 passing flatus; 1.2-1.8 Liters NGT dark output qday;  Mild lower abd pain; repeat CT is worse as above. To OR   6/19/19 POD1; no complaints  6/20/19 POD2; feels OK, no flatus, NGT 1550cc/24hrs  6/22/19 POD4; No complaints; feels OK, no flatus, NGT 1500cc/24hrs.  6/23/19 POD5; No complaints; feels OK, +flatus this am, NGT 1750cc/24hrs.         Past Medical History:   Diagnosis Date    Arteritis, unspecified ([facility] [address]     Asthma     Body mass index between 19-24, adult     Bronchiolitis     CA in situ skin     Chronic airway obstruction, not elsewhere classified 5/26/2015    Coccyx disorder     Colon polyps     Crushing injury     Dermatitis     dermatitis/urticaria    ED (erectile dysfunction)     Esophageal reflux 5/26/2015    Herpes zoster     Impotence of organic origin     Lumbar back pain     Pain in limb     Reflux esophagitis     Skin disorder     Tobacco use disorder 5/26/2015     Past Surgical History:   Procedure Laterality Date    HX HEART CATHETERIZATION  1999    HX HEMORRHOIDECTOMY  1999     Current Facility-Administered Medications   Medication Dose Route Frequency    dextrose 5% - 0.45% NaCl with KCl 20 mEq/L infusion  150 mL/hr IntraVENous CONTINUOUS    budesonide-formoterol (SYMBICORT) 160-4.5 mcg/actuation HFA inhaler 2 Puff (Patient Supplied)  2 Puff Inhalation BID PRN    pantoprazole (PROTONIX) 40 mg in sodium chloride 0.9% 10 mL injection  40 mg IntraVENous Q12H    enoxaparin (LOVENOX) injection 40 mg  40 mg SubCUTAneous Q24H    acetaminophen (TYLENOL) tablet 1,000 mg  1,000 mg Oral Q6H PRN    ondansetron (ZOFRAN) injection 4 mg  4 mg IntraVENous Q4H PRN    morphine 10 mg/ml injection 2-4 mg  2-4 mg IntraVENous Q1H PRN    phenol throat spray (CHLORASEPTIC) 1 Spray  1 Spray Oral PRN     Patient has no known allergies. Social History     Socioeconomic History    Marital status:      Spouse name: Not on file    Number of children: Not on file    Years of education: Not on file    Highest education level: Not on file   Tobacco Use    Smoking status: Current Every Day Smoker     Packs/day: 1.00     Years: 42.00     Pack years: 42.00    Smokeless tobacco: Former User     Quit date: 1/1/1991   Substance and Sexual Activity    Alcohol use: No    Drug use: No     Social History     Tobacco Use   Smoking Status Current Every Day Smoker    Packs/day: 1.00    Years: 42.00    Pack years: 42.00   Smokeless Tobacco Former User    Quit date: 1/1/1991     Family History   Problem Relation Age of Onset    Stroke Father         CVA    Heart Disease Father     Cancer Mother         Lung     ROS: The patient has no difficulty with chest pain or shortness of breath. No fever or chills. Comprehensive review of systems was otherwise unremarkable except as noted above. Physical Exam:   Visit Vitals  /71   Pulse (!) 58   Temp 98.1 °F (36.7 °C)   Resp 18   Ht 6' 1\" (1.854 m)   Wt 177 lb (80.3 kg)   SpO2 95%   BMI 23.35 kg/m²     Vitals:    06/22/19 1905 06/23/19 0025 06/23/19 0312 06/23/19 0709   BP: 122/81 128/76 136/69 132/71   Pulse: 67 (!) 59 91 (!) 58   Resp:  17 18 18   Temp: 97.5 °F (36.4 °C) 97.8 °F (36.6 °C) 97.7 °F (36.5 °C) 98.1 °F (36.7 °C)   SpO2: 96% 94% 96% 95%   Weight:       Height:         06/23 0701 - 06/23 1900  In: -   Out: 600 [Urine:600]  06/21 1901 - 06/23 0700  In: -   Out: 7500 [Urine:5150]    Constitutional: Alert, oriented, cooperative patient in no acute distress; appears stated age    Eyes:Sclera are clear. EOMs intact  ENMT: no external lesions gross hearing normal; no obvious neck masses, no ear or lip lesions, nares normal; +NGT  CV: RRR. Normal perfusion  Resp: No JVD.   Breathing is  non-labored; no audible wheezing. GI: dressing dry, Incision clear, no cellulitis       Musculoskeletal: unremarkable with normal function. No embolic signs or cyanosis. Neuro:  Oriented; moves all 4; no focal deficits  Psychiatric: normal affect and mood, no memory impairment    Recent vitals (if inpt):  Patient Vitals for the past 24 hrs:   BP Temp Pulse Resp SpO2   06/23/19 0709 132/71 98.1 °F (36.7 °C) (!) 58 18 95 %   06/23/19 0312 136/69 97.7 °F (36.5 °C) 91 18 96 %   06/23/19 0025 128/76 97.8 °F (36.6 °C) (!) 59 17 94 %   06/22/19 1905 122/81 97.5 °F (36.4 °C) 67  96 %   06/22/19 1517 119/78 97.9 °F (36.6 °C)  18 96 %   06/22/19 1118 144/73 98.7 °F (37.1 °C)  17 96 %       Labs:  Recent Labs     06/23/19  0528   WBC 7.8   HGB 13.1*         K 3.9      CO2 29   BUN 7*   CREA 0.67*   *       Lab Results   Component Value Date/Time    WBC 7.8 06/23/2019 05:28 AM    HGB 13.1 (L) 06/23/2019 05:28 AM    PLATELET 639 47/06/0329 05:28 AM    Sodium 142 06/23/2019 05:28 AM    Potassium 3.9 06/23/2019 05:28 AM    Chloride 107 06/23/2019 05:28 AM    CO2 29 06/23/2019 05:28 AM    BUN 7 (L) 06/23/2019 05:28 AM    Creatinine 0.67 (L) 06/23/2019 05:28 AM    Glucose 114 (H) 06/23/2019 05:28 AM    Bilirubin, total 0.7 06/19/2019 03:15 AM    AST (SGOT) 41 (H) 06/19/2019 03:15 AM    ALT (SGPT) 29 06/19/2019 03:15 AM    Alk. phosphatase 69 06/19/2019 03:15 AM       CT Results  (Last 48 hours)    None        chest X-ray      I reviewed recent labs, recent radiologic studies, and pertinent records including other doctor notes if needed. I independently reviewed radiology images for studies I described above or studies I have ordered.    Admission date (for inpatients): 6/13/2019   * No surgery found *  Procedure(s):  LAPAROTOMY EXPLORATORY REDUCTION OF INTERNAL HERNIA / EXCISION OF MESENTERIC MASS    ASSESSMENT/PLAN:  Problem List  Date Reviewed: 6/13/2019          Codes Class Noted    Oliguria ICD-10-CM: R34  ICD-9-CM: 788.5  6/18/2019        Bengn Mesenteric mass ICD-10-CM: K63.9  ICD-9-CM: 568.89  6/18/2019    Overview Signed 6/20/2019  6:13 PM by Sharon Almanzar MD     DIAGNOSIS  MESENTERIC MASS: BENIGN FIBROADIPOSE TISSUE, CONSISTENT WITH LIPOMA. Electronically signed out on 6/20/2019 10:56 by Miya Fernandez. Latisha Christina MD              Internal hernia ICD-10-CM: B64.2  ICD-9-CM: 553.8  6/18/2019        3.2cm AAA on CT 6/13/19 ICD-10-CM: I71.4  ICD-9-CM: 441.4  6/14/2019        Hiatal hernia ICD-10-CM: K44.9  ICD-9-CM: 553.3  6/14/2019        Generalized abdominal pain ICD-10-CM: R10.84  ICD-9-CM: 789.07  6/13/2019        * (Principal) Small Bowel Enteritis on CT ICD-10-CM: K52.9  ICD-9-CM: 558.9  6/13/2019        SBO (small bowel obstruction) ([facility] [address] ICD-10-CM: E74.108  ICD-9-CM: 560.9  6/13/2019    Overview Signed 6/18/2019  5:26 PM by Sharon Almanzar MD     6/18/19 s/p Expl lap for refractory SBO; Dr Vinnie Curtis             Leukocytosis ICD-10-CM: Q04.078  ICD-9-CM: 288.60  6/13/2019        Tobacco use disorder ICD-10-CM: W42.998  ICD-9-CM: 305.1  5/26/2015        Esophageal reflux ICD-10-CM: K21.9  ICD-9-CM: 530.81  5/26/2015        Chronic airway obstruction, not elsewhere classified ICD-10-CM: J44.9  ICD-9-CM: 151  5/26/2015            Principal Problem:    Small Bowel Enteritis on CT (6/13/2019)    Active Problems:    Tobacco use disorder (5/26/2015)      Generalized abdominal pain (6/13/2019)      SBO (small bowel obstruction) ([facility] [address] (6/13/2019)      Overview: 6/18/19 s/p Expl lap for refractory SBO; Dr Vinnie Curtis      Leukocytosis (6/13/2019)      3.2cm AAA on CT 6/13/19 (6/14/2019)      Hiatal hernia (6/14/2019)      Oliguria (6/18/2019)      Bengn Mesenteric mass (6/18/2019)      Overview: DIAGNOSIS  MESENTERIC MASS: BENIGN FIBROADIPOSE TISSUE, CONSISTENT WITH       LIPOMA. Electronically signed out on 6/20/2019 10:56 by Miya Fernandez.  Latisha Christina MD       Internal hernia (6/18/2019) SBO  He is s/p open reduction of closed loop obstruction from internal hernia 6/18/19 which caused a refractory SBO. Await bowel recovery, NGT out when flatus and its output is much less than it is now 1750cc/24hrs    May take awhile with swelling and ischemia noted intra-op  NPO/IVF/Bowel rest    GI will likely perform colonoscopy as outpt after acute illness resolves      Leukocytosis-->resolved post-op    3.2cm AAA on CT 6/13/19  Refer to vascular surgery as outpt for follow-up    Hiatal hernia on CT 6/13/19  Asymptomatic, no surgery planned    Benign Mesenteric mass unrelated to SBO  DIAGNOSIS  MESENTERIC MASS: BENIGN FIBROADIPOSE TISSUE, CONSISTENT WITH LIPOMA. Electronically signed out on 6/20/2019 10:56 by Oliver Swain.  MD Rosy Jackson, MAEVE

## 2019-06-23 NOTE — PROGRESS NOTES
END OF SHIFT NOTE:    INTAKE/OUTPUT  06/22 0701 - 06/23 0700  In: -   Out: 4200 [Urine:2450]  Voiding: YES  Catheter: NO  Drain:   Nasogastric Tube 06/13/19 (Active)   Site Assessment Clean, dry, & intact 6/23/2019 12:44 PM   Securement Device Tape 6/23/2019 12:44 PM   G Port Status Intermittent Suction 6/23/2019 12:44 PM   External Insertion Simone (cms) 78 cms 6/21/2019  4:00 AM   Action Taken Retaped 6/21/2019  2:37 PM   Drainage Description Danielle Crump 6/23/2019 12:44 PM   Drainage Chamber Level (ml) 650 ml 6/23/2019 10:21 AM   Output (ml) 250 ml 6/23/2019 10:21 AM               Flatus: Patient does have flatus present. Stool:  0 occurrences. Characteristics:       Emesis: 0 occurrences. Characteristics:        VITAL SIGNS  Patient Vitals for the past 12 hrs:   Temp Pulse Resp BP SpO2   06/23/19 1503 98.3 °F (36.8 °C) (!) 56 16 130/73 96 %   06/23/19 1100 97.7 °F (36.5 °C) (!) 56 16 121/72 94 %   06/23/19 0709 98.1 °F (36.7 °C) (!) 58 18 132/71 95 %       Pain Assessment  Pain Intensity 1: 4 (06/23/19 1102)  Pain Location 1: Abdomen  Pain Intervention(s) 1: Medication (see MAR)  Patient Stated Pain Goal: 0    Ambulating  Yes multiple laps in hallway    Shift report given to oncoming nurse at the bedside.     Danni Mckinley RN

## 2019-06-24 LAB
ANION GAP SERPL CALC-SCNC: 5 MMOL/L (ref 7–16)
BUN SERPL-MCNC: 7 MG/DL (ref 8–23)
CALCIUM SERPL-MCNC: 8.3 MG/DL (ref 8.3–10.4)
CHLORIDE SERPL-SCNC: 107 MMOL/L (ref 98–107)
CO2 SERPL-SCNC: 28 MMOL/L (ref 21–32)
CREAT SERPL-MCNC: 0.72 MG/DL (ref 0.8–1.5)
ERYTHROCYTE [DISTWIDTH] IN BLOOD BY AUTOMATED COUNT: 12.2 % (ref 11.9–14.6)
GLUCOSE SERPL-MCNC: 113 MG/DL (ref 65–100)
HCT VFR BLD AUTO: 40.5 % (ref 41.1–50.3)
HGB BLD-MCNC: 13.3 G/DL (ref 13.6–17.2)
MCH RBC QN AUTO: 30.4 PG (ref 26.1–32.9)
MCHC RBC AUTO-ENTMCNC: 32.8 G/DL (ref 31.4–35)
MCV RBC AUTO: 92.5 FL (ref 79.6–97.8)
NRBC # BLD: 0 K/UL (ref 0–0.2)
PLATELET # BLD AUTO: 285 K/UL (ref 150–450)
PMV BLD AUTO: 10.7 FL (ref 9.4–12.3)
POTASSIUM SERPL-SCNC: 4 MMOL/L (ref 3.5–5.1)
RBC # BLD AUTO: 4.38 M/UL (ref 4.23–5.6)
SODIUM SERPL-SCNC: 140 MMOL/L (ref 136–145)
WBC # BLD AUTO: 8.1 K/UL (ref 4.3–11.1)

## 2019-06-24 PROCEDURE — 74011250637 HC RX REV CODE- 250/637: Performed by: SURGERY

## 2019-06-24 PROCEDURE — 65270000029 HC RM PRIVATE

## 2019-06-24 PROCEDURE — 36415 COLL VENOUS BLD VENIPUNCTURE: CPT

## 2019-06-24 PROCEDURE — 80048 BASIC METABOLIC PNL TOTAL CA: CPT

## 2019-06-24 PROCEDURE — 74011000250 HC RX REV CODE- 250: Performed by: SURGERY

## 2019-06-24 PROCEDURE — 74011250636 HC RX REV CODE- 250/636: Performed by: SURGERY

## 2019-06-24 PROCEDURE — C9113 INJ PANTOPRAZOLE SODIUM, VIA: HCPCS | Performed by: SURGERY

## 2019-06-24 PROCEDURE — 85027 COMPLETE CBC AUTOMATED: CPT

## 2019-06-24 RX ADMIN — DEXTROSE MONOHYDRATE, SODIUM CHLORIDE, AND POTASSIUM CHLORIDE 100 ML/HR: 50; 4.5; 1.49 INJECTION, SOLUTION INTRAVENOUS at 22:11

## 2019-06-24 RX ADMIN — ENOXAPARIN SODIUM 40 MG: 40 INJECTION SUBCUTANEOUS at 08:51

## 2019-06-24 RX ADMIN — DEXTROSE MONOHYDRATE, SODIUM CHLORIDE, AND POTASSIUM CHLORIDE 100 ML/HR: 50; 4.5; 1.49 INJECTION, SOLUTION INTRAVENOUS at 11:10

## 2019-06-24 RX ADMIN — ACETAMINOPHEN 1000 MG: 500 TABLET, FILM COATED ORAL at 20:15

## 2019-06-24 RX ADMIN — SODIUM CHLORIDE 40 MG: 9 INJECTION, SOLUTION INTRAMUSCULAR; INTRAVENOUS; SUBCUTANEOUS at 20:16

## 2019-06-24 RX ADMIN — DEXTROSE MONOHYDRATE, SODIUM CHLORIDE, AND POTASSIUM CHLORIDE 150 ML/HR: 50; 4.5; 1.49 INJECTION, SOLUTION INTRAVENOUS at 02:20

## 2019-06-24 RX ADMIN — SODIUM CHLORIDE 40 MG: 9 INJECTION, SOLUTION INTRAMUSCULAR; INTRAVENOUS; SUBCUTANEOUS at 08:51

## 2019-06-24 NOTE — PROGRESS NOTES
END OF SHIFT NOTE:    INTAKE/OUTPUT  06/23 0701 - 06/24 0700  In: -   Out: 4000 [Urine:3150]  Voiding: YES  Catheter: NO  Drain:   Nasogastric Tube 06/13/19 (Active)   Site Assessment Clean, dry, & intact 6/24/2019  1:33 AM   Securement Device Tape 6/24/2019  1:33 AM   G Port Status Intermittent Suction 6/24/2019  1:33 AM   External Insertion Simone (cms) 78 cms 6/21/2019  4:00 AM   Action Taken Retaped 6/21/2019  2:37 PM   Drainage Description Glendia Amel 6/24/2019  1:33 AM   Drainage Chamber Level (ml) 600 ml 6/24/2019  6:47 AM   Output (ml) 200 ml 6/24/2019  6:47 AM               Flatus: Patient does have flatus present. Stool:  0 occurrences. Characteristics:       Emesis: 0 occurrences. Characteristics:        VITAL SIGNS  Patient Vitals for the past 12 hrs:   Temp Pulse Resp BP SpO2   06/24/19 0248 98.8 °F (37.1 °C) 75 18 137/76 97 %   06/23/19 2255 98.7 °F (37.1 °C) (!) 59 18 139/74 97 %   06/23/19 2006 98.5 °F (36.9 °C) 84 18 124/79 99 %       Pain Assessment  Pain Intensity 1: 0 (06/23/19 2345)  Pain Location 1: Abdomen  Pain Intervention(s) 1: Medication (see MAR)  Patient Stated Pain Goal: 0    Ambulating  Yes    Shift report given to oncoming nurse at the bedside.     Dorota Scott RN

## 2019-06-24 NOTE — PROGRESS NOTES
Nutrition follow-up:  Reason for initial assessment: Day 5 NPO  Assessment:  Food/Nutrition Patient History: S/P ex lap for refractory SBO and reduction of closed loop obstruction from internal hernia and excision of mesenteric mass on 6/18/19. NGT to LIS with 650 cc HAWKINS x 24 hours 6/23, discontinued this am.  Abdomen + flatus, with active bowel sounds. Malnutrition screening tool at admission was negative for weight loss and/or decreased appetite. Anthropometrics:Height: 6' 1\" (185.4 cm),  Weight: 80.3 kg (177 lb),  , Body mass index is 23.35 kg/m². BMI class of normal weight. Macronutrient needs:  EER:  3828-3256 kcal /day (25-30 kcal/kg listed BW)  EPR:  80-96 grams protein/day (1-1.2 grams/kg listed BW)  Intake/Comparative Standards: NPO, meeting 0% of needs. Receiving 612 non protein kcal from IVF.       Nutrition Diagnosis: Inadequate oral intake r/t altered GI function as evidenced by NPO x 10 days d/t SBO.     Intervention:  Meals and snacks: Diet per surgery. If diet is not expected to advance within the next 2-4 days, recommend initiation of nutrition support with enteral route preferred if medically appropriate. Coordination of Nutrition Care: Jaycee Shah, MELVA; Kalyani Haines RN, Care Manager. Discharge nutrition plan: Too soon to determine.     [place], [address] [place] [address], [address]

## 2019-06-24 NOTE — PROGRESS NOTES
H&P/Consult Note/Progress Note/Office Note:   Ntaaliia Patel  MRN: [id number]  :1955  Age:63 y.o.    HPI: Nataliia Patel is a 58 y.o. male who is s/p open reduction of closed loop obstruction from internal hernia 19 which caused a refractory SBO. Prior to surgery he was admitted from the ER on 19 after presenting with   a 1 day h/o progressive, constant, severe, non-radiating, generalized abd pain. Nothing made pain better or worse. No associated N/V/F/C or diarrhea  Normally has 1-2 solid BMs qday but lately BM only every other day  No h/o bloody diarrhea or sign diarrhea issues    WBC 12.7k  Surgery consulted after CT showed acute enteritis with component of underlying obstrauction  No h/o Crohn's or lymphoma  He is s/p appy in 1990s        19 CT abd/pelvis with PO and IV contrast  - Liver: Within normal limits. - Gallbladder and bile ducts: Within normal limits. - Spleen: Within normal limits. - Urinary tract: Within normal limits. - Adrenals: Within normal limits. - Pancreas: Within normal limits. - Gastrointestinal tract: There is an approximate 15 cm long segment of small  bowel wall thickening in the pelvis, in the region of the distal ileum, with  mesenteric hyperemia and trace free pelvic fluid. Findings are consistent with  acute enteritis. The more proximal small bowel loops are mildly distended,  suggesting a component of underlying obstruction as well. The terminal ileum has  a normal appearance. The colon is unremarkable. The appendix is not definitely  identified. No focal abscess or free air is identified.  - Retroperitoneum: There is a 3.2 cm fusiform infrarenal abdominal aortic  aneurysm, without evidence of dissection or rupture. - Peritoneal cavity and abdominal wall: Within normal limits. - Pelvis: Within normal limits. - Spine/bones: No acute process. - Other comments: The lung bases are clear. There is a small hiatal hernia. IMPRESSION:   1. Acute enteritis involving an approximately 15 cm long segment of pelvic small  bowel, with an associated mild small bowel obstruction. There is trace free  fluid in the pelvis as well.  2. 3.2 cm infrarenal abdominal aortic aneurysm. 3. Small hiatal hernia        6/18/19 CT abd/pevls without oral or IV contrast    ABD:  New small right pleural effusion with associated mild dependent subsegmental  atelectasis bilateral lung bases. Normal-appearing liver, gallbladder, spleen,  pancreas, bilateral adrenal glands and kidneys. Moderate calcific  atherosclerosis of a borderline aneurysmal infrarenal abdominal aorta measuring  up to 2.6 x 3.0 cm, unchanged. No evidence of significant lymphadenopathy. New  esophageal tube tip terminating in the gastric body. Interval increase in  diffuse fluid-filled dilatation of the proximal small bowel which is not well  evaluated on this noncontrast examination, however there appears to be a sharp  transition point in the left pelvis into decompressed distal small bowel loops,  this finding likely represents worsening mechanical small bowel obstruction  secondary to adhesions or bands. No evidence of intraperitoneal free air.     PELVIS:  Normal-appearing urinary bladder. Punctate prostatic calcifications. Normal-appearing seminal vesicles. Sigmoid diverticula. Normal-appearing  proximal colon. Increased trace pelvic free fluid. No evidence of significant  inguinal or pelvic sidewall lymphadenopathy. Chronic healed fracture  deformities of the right superior pubic ramus and inferior pubic ramus appears  similar to prior. Visualized osseous structures otherwise unremarkable.     IMPRESSION:      1. Apparent interval worsening of mechanical small bowel obstruction with sharp  transition point in the left pelvis, likely secondary to adhesions or bands. 2.  Increased trace pelvic ascites, with a new small right pleural effusion.   3.  Other chronic findings as above.                    Additional hx:  6/15/19 passing flatus; pain better  6/16/19 passing more flatus, no bm yet; some lower abd pain after getting up to BR earlier  6/17/19 passing more flatus; no BM yet; AF; mild lower abd pain  6/18/19 passing flatus; 1.2-1.8 Liters NGT dark output qday;  Mild lower abd pain; repeat CT is worse as above. To OR   6/19/19 POD1; no complaints  6/20/19 POD2; feels OK, no flatus, NGT 1550cc/24hrs  6/22/19 POD4; No complaints; feels OK, no flatus, NGT 1500cc/24hrs.  6/23/19 POD5;  No complaints; feels OK, +flatus this am, NGT 1750cc/24hrs.  6/24/19 POD6; No complaints; passing flatus; no abd pain      Past Medical History:   Diagnosis Date    Arteritis, unspecified ([address]     Asthma     Body mass index between 19-24, adult     Bronchiolitis     CA in situ skin     Chronic airway obstruction, not elsewhere classified 5/26/2015    Coccyx disorder     Colon polyps     Crushing injury     Dermatitis     dermatitis/urticaria    ED (erectile dysfunction)     Esophageal reflux 5/26/2015    Herpes zoster     Impotence of organic origin     Lumbar back pain     Pain in limb     Reflux esophagitis     Skin disorder     Tobacco use disorder 5/26/2015     Past Surgical History:   Procedure Laterality Date    HX HEART CATHETERIZATION  1999    HX HEMORRHOIDECTOMY  1999     Current Facility-Administered Medications   Medication Dose Route Frequency    dextrose 5% - 0.45% NaCl with KCl 20 mEq/L infusion  150 mL/hr IntraVENous CONTINUOUS    budesonide-formoterol (SYMBICORT) 160-4.5 mcg/actuation HFA inhaler 2 Puff (Patient Supplied)  2 Puff Inhalation BID PRN    pantoprazole (PROTONIX) 40 mg in sodium chloride 0.9% 10 mL injection  40 mg IntraVENous Q12H    enoxaparin (LOVENOX) injection 40 mg  40 mg SubCUTAneous Q24H    acetaminophen (TYLENOL) tablet 1,000 mg  1,000 mg Oral Q6H PRN    ondansetron (ZOFRAN) injection 4 mg  4 mg IntraVENous Q4H PRN    morphine 10 mg/ml injection 2-4 mg  2-4 mg IntraVENous Q1H PRN    phenol throat spray (CHLORASEPTIC) 1 Spray  1 Spray Oral PRN     Patient has no known allergies. Social History     Socioeconomic History    Marital status:      Spouse name: Not on file    Number of children: Not on file    Years of education: Not on file    Highest education level: Not on file   Tobacco Use    Smoking status: Current Every Day Smoker     Packs/day: 1.00     Years: 42.00     Pack years: 42.00    Smokeless tobacco: Former User     Quit date: 1/1/1991   Substance and Sexual Activity    Alcohol use: No    Drug use: No     Social History     Tobacco Use   Smoking Status Current Every Day Smoker    Packs/day: 1.00    Years: 42.00    Pack years: 42.00   Smokeless Tobacco Former User    Quit date: 1/1/1991     Family History   Problem Relation Age of Onset    Stroke Father         CVA    Heart Disease Father     Cancer Mother         Lung     ROS: The patient has no difficulty with chest pain or shortness of breath. No fever or chills. Comprehensive review of systems was otherwise unremarkable except as noted above. Physical Exam:   Visit Vitals  /76 (BP 1 Location: Right arm)   Pulse 75   Temp 98.8 °F (37.1 °C)   Resp 18   Ht 6' 1\" (1.854 m)   Wt 177 lb (80.3 kg)   SpO2 97%   BMI 23.35 kg/m²     Vitals:    06/23/19 1503 06/23/19 2006 06/23/19 2255 06/24/19 0248   BP: 130/73 124/79 139/74 137/76   Pulse: (!) 56 84 (!) 59 75   Resp: 16 18 18 18   Temp: 98.3 °F (36.8 °C) 98.5 °F (36.9 °C) 98.7 °F (37.1 °C) 98.8 °F (37.1 °C)   SpO2: 96% 99% 97% 97%   Weight:       Height:         06/23 1901 - 06/24 0700  In: -   Out: 1475 [NGVHN:3434]  06/22 0701 - 06/23 1900  In: -   Out: 7382 [Urine:3725]    Constitutional: Alert, oriented, cooperative patient in no acute distress; appears stated age    Eyes:Sclera are clear.  EOMs intact  ENMT: no external lesions gross hearing normal; no obvious neck masses, no ear or lip lesions, nares normal; +NGT  CV: RRR. Normal perfusion  Resp: No JVD. Breathing is  non-labored; no audible wheezing. GI: Incision clear, no cellulitis; abd soft       Musculoskeletal: unremarkable with normal function. No embolic signs or cyanosis. Neuro:  Oriented; moves all 4; no focal deficits  Psychiatric: normal affect and mood, no memory impairment    Recent vitals (if inpt):  Patient Vitals for the past 24 hrs:   BP Temp Pulse Resp SpO2   06/24/19 0248 137/76 98.8 °F (37.1 °C) 75 18 97 %   06/23/19 2255 139/74 98.7 °F (37.1 °C) (!) 59 18 97 %   06/23/19 2006 124/79 98.5 °F (36.9 °C) 84 18 99 %   06/23/19 1503 130/73 98.3 °F (36.8 °C) (!) 56 16 96 %   06/23/19 1100 121/72 97.7 °F (36.5 °C) (!) 56 16 94 %   06/23/19 0709 132/71 98.1 °F (36.7 °C) (!) 58 18 95 %       Labs:  Recent Labs     06/24/19  0340   WBC 8.1   HGB 13.3*         K 4.0      CO2 28   BUN 7*   CREA 0.72*   *       Lab Results   Component Value Date/Time    WBC 8.1 06/24/2019 03:40 AM    HGB 13.3 (L) 06/24/2019 03:40 AM    PLATELET 330 80/28/0760 03:40 AM    Sodium 140 06/24/2019 03:40 AM    Potassium 4.0 06/24/2019 03:40 AM    Chloride 107 06/24/2019 03:40 AM    CO2 28 06/24/2019 03:40 AM    BUN 7 (L) 06/24/2019 03:40 AM    Creatinine 0.72 (L) 06/24/2019 03:40 AM    Glucose 113 (H) 06/24/2019 03:40 AM    Bilirubin, total 0.7 06/19/2019 03:15 AM    AST (SGOT) 41 (H) 06/19/2019 03:15 AM    ALT (SGPT) 29 06/19/2019 03:15 AM    Alk. phosphatase 69 06/19/2019 03:15 AM       CT Results  (Last 48 hours)    None        chest X-ray      I reviewed recent labs, recent radiologic studies, and pertinent records including other doctor notes if needed. I independently reviewed radiology images for studies I described above or studies I have ordered.    Admission date (for inpatients): 6/13/2019   * No surgery found *  Procedure(s):  LAPAROTOMY EXPLORATORY REDUCTION OF INTERNAL HERNIA / EXCISION OF MESENTERIC MASS    ASSESSMENT/PLAN:  Problem List  Date Reviewed: 6/13/2019          Codes Class Noted    Oliguria ICD-10-CM: R34  ICD-9-CM: 788.5  6/18/2019        Bengn Mesenteric mass ICD-10-CM: K63.9  ICD-9-CM: 568.89  6/18/2019    Overview Signed 6/20/2019  6:13 PM by Shimon Rucker MD     DIAGNOSIS  MESENTERIC MASS: BENIGN FIBROADIPOSE TISSUE, CONSISTENT WITH LIPOMA. Electronically signed out on 6/20/2019 10:56 by Jun Jacques. Diego Valdes MD              Internal hernia ICD-10-CM: C54.8  ICD-9-CM: 553.8  6/18/2019        3.2cm AAA on CT 6/13/19 ICD-10-CM: I71.4  ICD-9-CM: 441.4  6/14/2019        Hiatal hernia ICD-10-CM: K44.9  ICD-9-CM: 553.3  6/14/2019        Generalized abdominal pain ICD-10-CM: R10.84  ICD-9-CM: 789.07  6/13/2019        * (Principal) Small Bowel Enteritis on CT ICD-10-CM: K52.9  ICD-9-CM: 558.9  6/13/2019        [facility] ICD-10-CM: C54.906  ICD-9-CM: 560.9  6/13/2019    Overview Signed 6/18/2019  5:26 PM by Shimon Rucker MD     6/18/19 s/p Expl lap for refractory SBO; Dr Jose Yin             Leukocytosis ICD-10-CM: T63.230  ICD-9-CM: 288.60  6/13/2019        Tobacco use disorder ICD-10-CM: X08.675  ICD-9-CM: 305.1  5/26/2015        Esophageal reflux ICD-10-CM: K21.9  ICD-9-CM: 530.81  5/26/2015        Chronic airway obstruction, not elsewhere classified ICD-10-CM: J44.9  ICD-9-CM: 712  5/26/2015            Principal Problem:    Small Bowel Enteritis on CT (6/13/2019)    Active Problems:    Tobacco use disorder (5/26/2015)      Generalized abdominal pain (6/13/2019)      SBO (small bowel obstruction) ([address] (6/13/2019)      Overview: 6/18/19 s/p Expl lap for refractory SBO; Dr Jose Yin      Leukocytosis (6/13/2019)      3.2cm AAA on CT 6/13/19 (6/14/2019)      Hiatal hernia (6/14/2019)      Oliguria (6/18/2019)      Bengn Mesenteric mass (6/18/2019)      Overview: DIAGNOSIS  MESENTERIC MASS: BENIGN FIBROADIPOSE TISSUE, CONSISTENT WITH       LIPOMA. Electronically signed out on 6/20/2019 10:56 by Robert Laurent MD       Internal hernia (6/18/2019)         SBO  He is s/p open reduction of closed loop obstruction from internal hernia 6/18/19 which caused a refractory SBO. Await bowel recovery, NGT output is much less 1750cc/24hrs--->650/24hrs  Remove NGT today (6/24/19)  NPO/IVF/Bowel rest        GI will likely perform colonoscopy as outpt after acute illness resolves      Leukocytosis-->resolved post-op    3.2cm AAA on CT 6/13/19  Refer to vascular surgery as outpt for follow-up    Hiatal hernia on CT 6/13/19  Asymptomatic, no surgery planned    Benign Mesenteric mass unrelated to SBO  DIAGNOSIS  MESENTERIC MASS: BENIGN FIBROADIPOSE TISSUE, CONSISTENT WITH LIPOMA. Electronically signed out on 6/20/2019 10:56 by Robert Amezcua. Liliana Laurent MD       I have personally performed a face-to-face diagnostic evaluation and management  service on this patient. I have independently seen the patient. I have independently obtained the above history from the patient/family. I have independently examined the patient with above findings. I have independently reviewed data/labs for this patient and developed the above plan of care (MDM). Signed: Felipe Chung.  Angela Kruger MD, FACS

## 2019-06-25 LAB
ANION GAP SERPL CALC-SCNC: 5 MMOL/L (ref 7–16)
BUN SERPL-MCNC: 7 MG/DL (ref 8–23)
CALCIUM SERPL-MCNC: 8.7 MG/DL (ref 8.3–10.4)
CHLORIDE SERPL-SCNC: 107 MMOL/L (ref 98–107)
CO2 SERPL-SCNC: 28 MMOL/L (ref 21–32)
CREAT SERPL-MCNC: 0.72 MG/DL (ref 0.8–1.5)
GLUCOSE SERPL-MCNC: 95 MG/DL (ref 65–100)
POTASSIUM SERPL-SCNC: 4.1 MMOL/L (ref 3.5–5.1)
SODIUM SERPL-SCNC: 140 MMOL/L (ref 136–145)

## 2019-06-25 PROCEDURE — 80048 BASIC METABOLIC PNL TOTAL CA: CPT

## 2019-06-25 PROCEDURE — 65270000029 HC RM PRIVATE

## 2019-06-25 PROCEDURE — 36415 COLL VENOUS BLD VENIPUNCTURE: CPT

## 2019-06-25 PROCEDURE — 74011250637 HC RX REV CODE- 250/637: Performed by: SURGERY

## 2019-06-25 PROCEDURE — 74011250636 HC RX REV CODE- 250/636: Performed by: SURGERY

## 2019-06-25 RX ORDER — PANTOPRAZOLE SODIUM 40 MG/1
40 TABLET, DELAYED RELEASE ORAL
Status: DISCONTINUED | OUTPATIENT
Start: 2019-06-25 | End: 2019-06-26 | Stop reason: HOSPADM

## 2019-06-25 RX ORDER — TAMSULOSIN HYDROCHLORIDE 0.4 MG/1
0.4 CAPSULE ORAL DAILY
Status: DISCONTINUED | OUTPATIENT
Start: 2019-06-25 | End: 2019-06-26 | Stop reason: HOSPADM

## 2019-06-25 RX ORDER — OXYCODONE HYDROCHLORIDE 5 MG/1
5-10 TABLET ORAL
Status: DISCONTINUED | OUTPATIENT
Start: 2019-06-25 | End: 2019-06-26 | Stop reason: HOSPADM

## 2019-06-25 RX ADMIN — DEXTROSE MONOHYDRATE, SODIUM CHLORIDE, AND POTASSIUM CHLORIDE 75 ML/HR: 50; 4.5; 1.49 INJECTION, SOLUTION INTRAVENOUS at 22:53

## 2019-06-25 RX ADMIN — MORPHINE SULFATE 4 MG: 10 INJECTION, SOLUTION INTRAMUSCULAR; INTRAVENOUS at 22:53

## 2019-06-25 RX ADMIN — ENOXAPARIN SODIUM 40 MG: 40 INJECTION SUBCUTANEOUS at 08:06

## 2019-06-25 RX ADMIN — DEXTROSE MONOHYDRATE, SODIUM CHLORIDE, AND POTASSIUM CHLORIDE 100 ML/HR: 50; 4.5; 1.49 INJECTION, SOLUTION INTRAVENOUS at 05:44

## 2019-06-25 RX ADMIN — TAMSULOSIN HYDROCHLORIDE 0.4 MG: 0.4 CAPSULE ORAL at 08:06

## 2019-06-25 RX ADMIN — PANTOPRAZOLE SODIUM 40 MG: 40 TABLET, DELAYED RELEASE ORAL at 08:06

## 2019-06-25 NOTE — PROGRESS NOTES
H&P/Consult Note/Progress Note/Office Note:   Iván Cunha  MRN: [id number]  :1955  Age:63 y.o.    HPI: Iván Cunha is a 58 y.o. male who is s/p open reduction of closed loop obstruction from internal hernia 19 which caused a refractory SBO. Prior to surgery he was admitted from the ER on 19 after presenting with   a 1 day h/o progressive, constant, severe, non-radiating, generalized abd pain. Nothing made pain better or worse. No associated N/V/F/C or diarrhea  Normally has 1-2 solid BMs qday but lately BM only every other day  No h/o bloody diarrhea or sign diarrhea issues    WBC 12.7k  Surgery consulted after CT showed acute enteritis with component of underlying obstrauction  No h/o Crohn's or lymphoma  He is s/p appy in 1990s        19 CT abd/pelvis with PO and IV contrast  - Liver: Within normal limits. - Gallbladder and bile ducts: Within normal limits. - Spleen: Within normal limits. - Urinary tract: Within normal limits. - Adrenals: Within normal limits. - Pancreas: Within normal limits. - Gastrointestinal tract: There is an approximate 15 cm long segment of small  bowel wall thickening in the pelvis, in the region of the distal ileum, with  mesenteric hyperemia and trace free pelvic fluid. Findings are consistent with  acute enteritis. The more proximal small bowel loops are mildly distended,  suggesting a component of underlying obstruction as well. The terminal ileum has  a normal appearance. The colon is unremarkable. The appendix is not definitely  identified. No focal abscess or free air is identified.  - Retroperitoneum: There is a 3.2 cm fusiform infrarenal abdominal aortic  aneurysm, without evidence of dissection or rupture. - Peritoneal cavity and abdominal wall: Within normal limits. - Pelvis: Within normal limits. - Spine/bones: No acute process. - Other comments: The lung bases are clear. There is a small hiatal hernia. IMPRESSION:   1. Acute enteritis involving an approximately 15 cm long segment of pelvic small  bowel, with an associated mild small bowel obstruction. There is trace free  fluid in the pelvis as well.  2. 3.2 cm infrarenal abdominal aortic aneurysm. 3. Small hiatal hernia        6/18/19 CT abd/pevls without oral or IV contrast    ABD:  New small right pleural effusion with associated mild dependent subsegmental  atelectasis bilateral lung bases. Normal-appearing liver, gallbladder, spleen,  pancreas, bilateral adrenal glands and kidneys. Moderate calcific  atherosclerosis of a borderline aneurysmal infrarenal abdominal aorta measuring  up to 2.6 x 3.0 cm, unchanged. No evidence of significant lymphadenopathy. New  esophageal tube tip terminating in the gastric body. Interval increase in  diffuse fluid-filled dilatation of the proximal small bowel which is not well  evaluated on this noncontrast examination, however there appears to be a sharp  transition point in the left pelvis into decompressed distal small bowel loops,  this finding likely represents worsening mechanical small bowel obstruction  secondary to adhesions or bands. No evidence of intraperitoneal free air.     PELVIS:  Normal-appearing urinary bladder. Punctate prostatic calcifications. Normal-appearing seminal vesicles. Sigmoid diverticula. Normal-appearing  proximal colon. Increased trace pelvic free fluid. No evidence of significant  inguinal or pelvic sidewall lymphadenopathy. Chronic healed fracture  deformities of the right superior pubic ramus and inferior pubic ramus appears  similar to prior. Visualized osseous structures otherwise unremarkable.     IMPRESSION:      1. Apparent interval worsening of mechanical small bowel obstruction with sharp  transition point in the left pelvis, likely secondary to adhesions or bands. 2.  Increased trace pelvic ascites, with a new small right pleural effusion.   3.  Other chronic findings as above.                    Additional hx:  6/15/19 passing flatus; pain better  6/16/19 passing more flatus, no bm yet; some lower abd pain after getting up to BR earlier  6/17/19 passing more flatus; no BM yet; AF; mild lower abd pain  6/18/19 passing flatus; 1.2-1.8 Liters NGT dark output qday;  Mild lower abd pain; repeat CT is worse as above. To OR   6/19/19 POD1; no complaints  6/20/19 POD2; feels OK, no flatus, NGT 1550cc/24hrs  6/22/19 POD4; No complaints; feels OK, no flatus, NGT 1500cc/24hrs.  6/23/19 POD5; No complaints; feels OK, +flatus this am, NGT 1750cc/24hrs.  6/24/19 POD6; No complaints; passing flatus; no abd pain  6/25/19 POD7; + flatus;  Tolerated NGT removal;  Start Liquids; 1650cc/24hrs urine            Past Medical History:   Diagnosis Date    Arteritis, unspecified ([facility] [address]     Asthma     Body mass index between 19-24, adult     Bronchiolitis     CA in situ skin     Chronic airway obstruction, not elsewhere classified 5/26/2015    Coccyx disorder     Colon polyps     Crushing injury     Dermatitis     dermatitis/urticaria    ED (erectile dysfunction)     Esophageal reflux 5/26/2015    Herpes zoster     Impotence of organic origin     Lumbar back pain     Pain in limb     Reflux esophagitis     Skin disorder     Tobacco use disorder 5/26/2015     Past Surgical History:   Procedure Laterality Date    HX HEART CATHETERIZATION  1999    HX HEMORRHOIDECTOMY  1999     Current Facility-Administered Medications   Medication Dose Route Frequency    oxyCODONE IR (ROXICODONE) tablet 5-10 mg  5-10 mg Oral Q4H PRN    dextrose 5% - 0.45% NaCl with KCl 20 mEq/L infusion  100 mL/hr IntraVENous CONTINUOUS    budesonide-formoterol (SYMBICORT) 160-4.5 mcg/actuation HFA inhaler 2 Puff (Patient Supplied)  2 Puff Inhalation BID PRN    pantoprazole (PROTONIX) 40 mg in sodium chloride 0.9% 10 mL injection  40 mg IntraVENous Q12H    enoxaparin (LOVENOX) injection 40 mg  40 mg SubCUTAneous Q24H    acetaminophen (TYLENOL) tablet 1,000 mg  1,000 mg Oral Q6H PRN    ondansetron (ZOFRAN) injection 4 mg  4 mg IntraVENous Q4H PRN    morphine 10 mg/ml injection 2-4 mg  2-4 mg IntraVENous Q1H PRN    phenol throat spray (CHLORASEPTIC) 1 Spray  1 Spray Oral PRN     Patient has no known allergies. Social History     Socioeconomic History    Marital status:      Spouse name: Not on file    Number of children: Not on file    Years of education: Not on file    Highest education level: Not on file   Tobacco Use    Smoking status: Current Every Day Smoker     Packs/day: 1.00     Years: 42.00     Pack years: 42.00    Smokeless tobacco: Former User     Quit date: 1/1/1991   Substance and Sexual Activity    Alcohol use: No    Drug use: No     Social History     Tobacco Use   Smoking Status Current Every Day Smoker    Packs/day: 1.00    Years: 42.00    Pack years: 42.00   Smokeless Tobacco Former User    Quit date: 1/1/1991     Family History   Problem Relation Age of Onset    Stroke Father         CVA    Heart Disease Father     Cancer Mother         Lung     ROS: The patient has no difficulty with chest pain or shortness of breath. No fever or chills. Comprehensive review of systems was otherwise unremarkable except as noted above.     Physical Exam:   Visit Vitals  /73 (BP 1 Location: Right arm, BP Patient Position: At rest)   Pulse (!) 58   Temp 98.6 °F (37 °C)   Resp 16   Ht 6' 1\" (1.854 m)   Wt 177 lb (80.3 kg)   SpO2 95%   BMI 23.35 kg/m²     Vitals:    06/24/19 1428 06/24/19 1900 06/24/19 2300 06/25/19 0300   BP: 134/72 127/77 128/70 132/73   Pulse: 72 62 (!) 58 (!) 58   Resp: 16 17 17 16   Temp: 98.3 °F (36.8 °C) 97.6 °F (36.4 °C) 97.9 °F (36.6 °C) 98.6 °F (37 °C)   SpO2: 96% 93% 91% 95%   Weight:       Height:         06/24 1901 - 06/25 0700  In: 716 [I.V.:716]  Out: 800 [Urine:800]  06/23 0701 - 06/24 1900  In: 8269 [I.V.:6181]  Out: 4900 [Urine:4000]    Constitutional: Alert, oriented, cooperative patient in no acute distress; appears stated age    Eyes:Sclera are clear. EOMs intact  ENMT: no external lesions gross hearing normal; no obvious neck masses, no ear or lip lesions, nares normal; +NGT  CV: RRR. Normal perfusion  Resp: No JVD. Breathing is  non-labored; no audible wheezing. GI: Incision clear, no cellulitis; abd soft       Musculoskeletal: unremarkable with normal function. No embolic signs or cyanosis. Neuro:  Oriented; moves all 4; no focal deficits  Psychiatric: normal affect and mood, no memory impairment    Recent vitals (if inpt):  Patient Vitals for the past 24 hrs:   BP Temp Pulse Resp SpO2   06/25/19 0300 132/73 98.6 °F (37 °C) (!) 58 16 95 %   06/24/19 2300 128/70 97.9 °F (36.6 °C) (!) 58 17 91 %   06/24/19 1900 127/77 97.6 °F (36.4 °C) 62 17 93 %   06/24/19 1428 134/72 98.3 °F (36.8 °C) 72 16 96 %   06/24/19 1150 145/75 98 °F (36.7 °C) 84 14 98 %   06/24/19 0742 133/66 97.8 °F (36.6 °C) (!) 56 16 95 %       Labs:  Recent Labs     06/25/19  0411 06/24/19  0340   WBC  --  8.1   HGB  --  13.3*   PLT  --  285    140   K 4.1 4.0    107   CO2 28 28   BUN 7* 7*   CREA 0.72* 0.72*   GLU 95 113*       Lab Results   Component Value Date/Time    WBC 8.1 06/24/2019 03:40 AM    HGB 13.3 (L) 06/24/2019 03:40 AM    PLATELET 425 18/15/2017 03:40 AM    Sodium 140 06/25/2019 04:11 AM    Potassium 4.1 06/25/2019 04:11 AM    Chloride 107 06/25/2019 04:11 AM    CO2 28 06/25/2019 04:11 AM    BUN 7 (L) 06/25/2019 04:11 AM    Creatinine 0.72 (L) 06/25/2019 04:11 AM    Glucose 95 06/25/2019 04:11 AM    Bilirubin, total 0.7 06/19/2019 03:15 AM    AST (SGOT) 41 (H) 06/19/2019 03:15 AM    ALT (SGPT) 29 06/19/2019 03:15 AM    Alk. phosphatase 69 06/19/2019 03:15 AM       CT Results  (Last 48 hours)    None        chest X-ray      I reviewed recent labs, recent radiologic studies, and pertinent records including other doctor notes if needed.     I independently reviewed radiology images for studies I described above or studies I have ordered. Admission date (for inpatients): 6/13/2019   * No surgery found *  Procedure(s):  LAPAROTOMY EXPLORATORY REDUCTION OF INTERNAL HERNIA / EXCISION OF MESENTERIC MASS    ASSESSMENT/PLAN:  Problem List  Date Reviewed: 6/13/2019          Codes Class Noted    Oliguria ICD-10-CM: R34  ICD-9-CM: 788.5  6/18/2019        Bengn Mesenteric mass ICD-10-CM: K63.9  ICD-9-CM: 568.89  6/18/2019    Overview Signed 6/20/2019  6:13 PM by Bonnie Quesada MD     DIAGNOSIS  MESENTERIC MASS: BENIGN FIBROADIPOSE TISSUE, CONSISTENT WITH LIPOMA. Electronically signed out on 6/20/2019 10:56 by Lauren Ba.  Katherine Lincoln MD              Internal hernia ICD-10-CM: F18.5  ICD-9-CM: 553.8  6/18/2019        3.2cm AAA on CT 6/13/19 ICD-10-CM: I71.4  ICD-9-CM: 441.4  6/14/2019        Hiatal hernia ICD-10-CM: K44.9  ICD-9-CM: 553.3  6/14/2019        Generalized abdominal pain ICD-10-CM: R10.84  ICD-9-CM: 789.07  6/13/2019        * (Principal) Small Bowel Enteritis on CT ICD-10-CM: K52.9  ICD-9-CM: 558.9  6/13/2019        [facility] obstruction[facility] ([facility] ICD-10-CM: X08.628  ICD-9-CM: 560.9  6/13/2019    Overview Signed 6/18/2019  5:26 PM by Bonnie Quesada MD     6/18/19 s/p Expl lap for refractory SBO; Dr Montserrat Kaplan             Leukocytosis ICD-10-CM: C13.183  ICD-9-CM: 288.60  6/13/2019        Tobacco use disorder ICD-10-CM: T29.941  ICD-9-CM: 305.1  5/26/2015        Esophageal reflux ICD-10-CM: K21.9  ICD-9-CM: 530.81  5/26/2015        Chronic airway obstruction, not elsewhere classified ICD-10-CM: J44.9  ICD-9-CM: 664  5/26/2015            Principal Problem:    Small Bowel Enteritis on CT (6/13/2019)    Active Problems:    Tobacco use disorder (5/26/2015)      Generalized abdominal pain (6/13/2019)      SBO (small bowel obstruction) ([facility] [address] (6/13/2019)      Overview: 6/18/19 s/p Expl lap for refractory SBO; Dr Montserrat Kaplan      Leukocytosis (6/13/2019)      3.2cm AAA on CT 6/13/19 (6/14/2019)      Hiatal hernia (6/14/2019)      Oliguria (6/18/2019)      Bengn Mesenteric mass (6/18/2019)      Overview: DIAGNOSIS  MESENTERIC MASS: BENIGN FIBROADIPOSE TISSUE, CONSISTENT WITH       LIPOMA. Electronically signed out on 6/20/2019 10:56 by Robert Laurent MD       Internal hernia (6/18/2019)         SBO  He is s/p open reduction of closed loop obstruction from internal hernia 6/18/19 which caused a refractory SBO. Tolerated NGT out on 6/24/19  Start clears today  Home tomorrow if he tolerates liquid diet      GI will likely perform colonoscopy as outpt after acute illness resolves      Leukocytosis-->resolved post-op    3.2cm AAA on CT 6/13/19  Refer to vascular surgery as outpt for follow-up    Hiatal hernia on CT 6/13/19  Asymptomatic, no surgery planned    Benign Mesenteric mass unrelated to SBO  DIAGNOSIS  MESENTERIC MASS: BENIGN FIBROADIPOSE TISSUE, CONSISTENT WITH LIPOMA. Electronically signed out on 6/20/2019 10:56 by Robert Amezcua. Liliana Laurent MD       I have personally performed a face-to-face diagnostic evaluation and management  service on this patient. I have independently seen the patient. I have independently obtained the above history from the patient/family. I have independently examined the patient with above findings. I have independently reviewed data/labs for this patient and developed the above plan of care (MDM). Signed: Felipe Chung.  Angela Kruger MD, FACS

## 2019-06-25 NOTE — DISCHARGE SUMMARY
[facility] [address]  [place], [address] [facility]  [phone]   Discharge Summary     Lavell Lua  MRN: [id number]     : 1955     Age: 58 y.o. Admit date: 2019     Discharge date:  19  Attending Physician: Gerry Corral MD, MD, FACS  Primary Discharge Diagnosis:   Principal Problem:    Small Bowel Enteritis on CT (2019)    Active Problems:    Tobacco use disorder (2015)      Generalized abdominal pain (2019)      SBO (small bowel obstruction) ([address] (2019)      Overview: 19 s/p Expl lap for refractory SBO; Dr Fior Mcmullen      Leukocytosis (2019)      3.2cm AAA on CT 19 (2019)      Hiatal hernia (2019)      Oliguria (2019)      Bengn Mesenteric mass (2019)      Overview: DIAGNOSIS  MESENTERIC MASS: BENIGN FIBROADIPOSE TISSUE, CONSISTENT WITH       LIPOMA. Electronically signed out on 2019 10:56 by Ami Castano. Mitch Figueredo MD       Internal hernia (2019)      Primary Operations or Procedures Performed :  Procedure(s):  LAPAROTOMY EXPLORATORY REDUCTION OF INTERNAL HERNIA / EXCISION OF MESENTERIC MASS     Brief History and Reason for Admission: Lavell Lua was admitted with the following history of present illness. HPI: Lavell Lua is a 58 y.o. male who is s/p open reduction of closed loop obstruction from internal hernia 19 which caused a refractory SBO.     Prior to surgery he was admitted from the ER on 19 after presenting with   a 1 day h/o progressive, constant, severe, non-radiating, generalized abd pain. Nothing made pain better or worse.   No associated N/V/F/C or diarrhea  Normally has 1-2 solid BMs qday but lately BM only every other day  No h/o bloody diarrhea or sign diarrhea issues     WBC 12.7k  Surgery consulted after CT showed acute enteritis with component of underlying obstrauction  No h/o Crohn's or lymphoma  He is s/p appy in 36s           6/13/19 CT abd/pelvis with PO and IV contrast  - Liver: Within normal limits. - Gallbladder and bile ducts: Within normal limits. - Spleen: Within normal limits. - Urinary tract: Within normal limits. - Adrenals: Within normal limits. - Pancreas: Within normal limits. - Gastrointestinal tract: There is an approximate 15 cm long segment of small  bowel wall thickening in the pelvis, in the region of the distal ileum, with  mesenteric hyperemia and trace free pelvic fluid. Findings are consistent with  acute enteritis. The more proximal small bowel loops are mildly distended,  suggesting a component of underlying obstruction as well. The terminal ileum has  a normal appearance. The colon is unremarkable. The appendix is not definitely  identified. No focal abscess or free air is identified.  - Retroperitoneum: There is a 3.2 cm fusiform infrarenal abdominal aortic  aneurysm, without evidence of dissection or rupture. - Peritoneal cavity and abdominal wall: Within normal limits. - Pelvis: Within normal limits. - Spine/bones: No acute process. - Other comments: The lung bases are clear. There is a small hiatal hernia.     IMPRESSION:   1. Acute enteritis involving an approximately 15 cm long segment of pelvic small  bowel, with an associated mild small bowel obstruction. There is trace free  fluid in the pelvis as well.  2. 3.2 cm infrarenal abdominal aortic aneurysm.   3. Small hiatal hernia           6/18/19 CT abd/pevls without oral or IV contrast     ABD:  New small right pleural effusion with associated mild dependent subsegmental  atelectasis bilateral lung bases.  Normal-appearing liver, gallbladder, spleen,  pancreas, bilateral adrenal glands and kidneys.  Moderate calcific  atherosclerosis of a borderline aneurysmal infrarenal abdominal aorta measuring  up to 2.6 x 3.0 cm, unchanged.  No evidence of significant lymphadenopathy.  New  esophageal tube tip terminating in the gastric body.  Interval increase in  diffuse fluid-filled dilatation of the proximal small bowel which is not well  evaluated on this noncontrast examination, however there appears to be a sharp  transition point in the left pelvis into decompressed distal small bowel loops,  this finding likely represents worsening mechanical small bowel obstruction  secondary to adhesions or bands.  No evidence of intraperitoneal free air.     PELVIS:  Normal-appearing urinary bladder.  Punctate prostatic calcifications. Normal-appearing seminal vesicles.  Sigmoid diverticula.  Normal-appearing  proximal colon.  Increased trace pelvic free fluid.  No evidence of significant  inguinal or pelvic sidewall lymphadenopathy.  Chronic healed fracture  deformities of the right superior pubic ramus and inferior pubic ramus appears  similar to prior.  Visualized osseous structures otherwise unremarkable.     IMPRESSION:      1.  Apparent interval worsening of mechanical small bowel obstruction with sharp  transition point in the left pelvis, likely secondary to adhesions or bands. 2.  Increased trace pelvic ascites, with a new small right pleural effusion. 3.  Other chronic findings as above.                    [facility] Course:      6/15/19 passing flatus; pain better  6/16/19 passing more flatus, no bm yet; some lower abd pain after getting up to BR earlier  6/17/19 passing more flatus; no BM yet; AF; mild lower abd pain  6/18/19 passing flatus; 1.2-1.8 Liters NGT dark output qday;  Mild lower abd pain; repeat CT is worse as above. To OR   6/19/19 POD1; no complaints  6/20/19 POD2; feels OK, no flatus, NGT 1550cc/24hrs  6/22/19 POD4; No complaints; feels OK, no flatus, NGT 1500cc/24hrs.  6/23/19 POD5; No complaints; feels OK, +flatus this am, NGT 1750cc/24hrs.  6/24/19 POD6; No complaints; passing flatus; no abd pain  6/25/19 POD7; + flatus;  Tolerated NGT removal;  Start Liquids; 1650cc/24hrs urine  6/26/18 feels well, No N/V, Home today         Condition at Discharge: good    Discharge Medications:   Current Discharge Medication List      CONTINUE these medications which have NOT CHANGED    Details   tamsulosin (FLOMAX) 0.4 mg capsule Take 0.4 mg by mouth daily. cetirizine (ZYRTEC) 10 mg tablet Take 10 mg by mouth daily. albuterol (PROVENTIL HFA, VENTOLIN HFA, PROAIR HFA) 90 mcg/actuation inhaler 1-2 puffs q 4-6 hours prn wheezing  Qty: 1 Inhaler, Refills: 1    Associated Diagnoses: Wheezing      omeprazole (PRILOSEC) 40 mg capsule Take 40 mg by mouth daily. aspirin (ASPIRIN CHILDRENS) 81 mg chewable tablet Take 81 mg by mouth daily. Disposition/Discharge Instructions/Follow-up Care:        Replace dry gauze and tape over incisions as needed. Keep them dry to lower risk of infection    No heavy lifting (>5lbs) for 6 weeks to reduce risk of developing a hernia in the incisions. No driving until you are off pain meds for 24hrs and have no pain with movements associated with driving. Pain prescription (Norco) on chart for patient to use as needed for pain  Follow-up with Dr Larios Oven nurse practitioner Yasmin Klinefelter, NP) in 12 days on a Monday in the office. Then see Dr Sharyle Goldstein as needed after that visit.   [address], [address]  (Call for an appt time-->[phone]-->option 1)    Diet; Soups, Juices, and Liquids for 1-2 days, then soft foods until follow-up    Follow-up with vascular surgery for your 3.2cm AAA shown on CT from 6/13/19    Signed:  Rachael Seymour MD, FACS   6/25/2019  2:36 PM

## 2019-06-25 NOTE — PROGRESS NOTES
06/24/19 2016   Pain 1   Pain Scale 1 Numeric (0 - 10)   Pain Intensity 1 3   Pain Location 1 Head   Pain Description 1 Aching   Pain Intervention(s) 1 Medication (see MAR)

## 2019-06-25 NOTE — PROGRESS NOTES
Follow up visit to build relationship of connectedness, care & emotional / spiritual support.      ____x__    active listening/ guided questions to understand pt's spiritual needs    ______    exploration of hope & the presence of God to normalize struggles as Holy, reframe, introduce coping skills    ___x____   prayer / blessing of pt & struggles, to convey peace & lessen anxiety     Additional  Comments / interventions:         Janee Grullon, [facility], PhD  Svetlana Joiner

## 2019-06-26 VITALS
BODY MASS INDEX: 23.46 KG/M2 | TEMPERATURE: 97.8 F | SYSTOLIC BLOOD PRESSURE: 136 MMHG | RESPIRATION RATE: 18 BRPM | OXYGEN SATURATION: 95 % | DIASTOLIC BLOOD PRESSURE: 67 MMHG | HEIGHT: 73 IN | WEIGHT: 177 LBS | HEART RATE: 62 BPM

## 2019-06-26 LAB
ANION GAP SERPL CALC-SCNC: 8 MMOL/L (ref 7–16)
BUN SERPL-MCNC: 6 MG/DL (ref 8–23)
CALCIUM SERPL-MCNC: 8.6 MG/DL (ref 8.3–10.4)
CHLORIDE SERPL-SCNC: 108 MMOL/L (ref 98–107)
CO2 SERPL-SCNC: 25 MMOL/L (ref 21–32)
CREAT SERPL-MCNC: 0.68 MG/DL (ref 0.8–1.5)
GLUCOSE SERPL-MCNC: 93 MG/DL (ref 65–100)
POTASSIUM SERPL-SCNC: 4.1 MMOL/L (ref 3.5–5.1)
SODIUM SERPL-SCNC: 141 MMOL/L (ref 136–145)

## 2019-06-26 PROCEDURE — 74011250636 HC RX REV CODE- 250/636: Performed by: SURGERY

## 2019-06-26 PROCEDURE — 74011250637 HC RX REV CODE- 250/637: Performed by: SURGERY

## 2019-06-26 PROCEDURE — 80048 BASIC METABOLIC PNL TOTAL CA: CPT

## 2019-06-26 PROCEDURE — 36415 COLL VENOUS BLD VENIPUNCTURE: CPT

## 2019-06-26 RX ADMIN — ENOXAPARIN SODIUM 40 MG: 40 INJECTION SUBCUTANEOUS at 08:51

## 2019-06-26 RX ADMIN — PANTOPRAZOLE SODIUM 40 MG: 40 TABLET, DELAYED RELEASE ORAL at 06:13

## 2019-06-26 RX ADMIN — TAMSULOSIN HYDROCHLORIDE 0.4 MG: 0.4 CAPSULE ORAL at 08:50

## 2019-06-26 NOTE — DISCHARGE INSTRUCTIONS
Replace dry gauze and tape over incisions as needed. Keep them dry to lower risk of infection    No heavy lifting (>5lbs) for 6 weeks to reduce risk of developing a hernia in the incisions. No driving until you are off pain meds for 24hrs and have no pain with movements associated with driving. Pain prescription (Norco) on chart for patient to use as needed for pain  Follow-up with Dr Josue Cabello nurse practitioner Lea Wade, MAEVE) in 12 days on a Monday in the office. Then see Dr Chantelle Dodge as needed after that visit. [address], [address]  (Call for an appt time-->[phone]-->option 1)    Diet; Soups, Juices, and Liquids for 1-2 days, then soft foods until follow-up      Follow-up with vascular surgery for your 3.2cm AAA shown on CT from 6/13/19       Hernia Repair: What to Expect at [address] [place] [address] are likely to have pain for the next few days. You may also feel like you have the flu, and you may have a low fever and feel tired and nauseated. This is common. You should feel better after a few days and will probably feel much better in 7 days. For several weeks you may feel twinges or pulling in the hernia repair when you move. You may have some bruising on the scrotum and along the penis. This is normal. Men will need to wear a jockstrap or briefs, not boxers, for scrotal support for several days after a groin (inguinal) hernia repair. [employer] bicycle shorts may provide good support. This care sheet gives you a general idea about how long it will take for you to recover. But each person recovers at a different pace. Follow the steps below to get better as quickly as possible. How can you care for yourself at home? Activity    · Rest when you feel tired. Getting enough sleep will help you recover.     · Try to walk each day. Start by walking a little more than you did the day before. Bit by bit, increase the amount you walk.  Walking boosts blood flow and helps prevent pneumonia and constipation.     · Avoid strenuous activities, such as biking, jogging, weight lifting, or aerobic exercise, until your doctor says it is okay.     · Avoid lifting anything that would make you strain. This may include heavy grocery bags and milk containers, a heavy briefcase or backpack, cat litter or dog food bags, a vacuum , or a child.     · You may drive when you are no longer taking pain medicine and can quickly move your foot from the gas pedal to the brake. You must also be able to sit comfortably for a long period of time, even if you do not plan to go far. You might get caught in traffic.     · Most people are able to return to work within 1 to 2 weeks after surgery.     · You may shower 24 to 48 hours after surgery, if your doctor okays it. Pat the cut (incision) dry. Do not take a bath for the first 2 weeks, or until your doctor tells you it is okay.     · Your doctor will tell you when you can have sex again. Diet    · You can eat your normal diet. If your stomach is upset, try bland, low-fat foods like plain rice, broiled chicken, toast, and yogurt.     · Drink plenty of fluids (unless your doctor tells you not to).     · You may notice that your bowel movements are not regular right after your surgery. This is common. Avoid constipation and straining with bowel movements. You may want to take a fiber supplement every day. If you have not had a bowel movement after a couple of days, ask your doctor about taking a mild laxative. Medicines    · Your doctor will tell you if and when you can restart your medicines. He or she will also give you instructions about taking any new medicines.     · If you take blood thinners, such as warfarin (Coumadin), clopidogrel (Plavix), or aspirin, be sure to talk to your doctor. He or she will tell you if and when to start taking those medicines again.  Make sure that you understand exactly what your doctor wants you to do.     · Be safe with medicines. Take pain medicines exactly as directed. ? If the doctor gave you a prescription medicine for pain, take it as prescribed. ? If you are not taking a prescription pain medicine, take an over-the-counter medicine such as acetaminophen (Tylenol), ibuprofen (Advil, Motrin), or naproxen (Aleve). Read and follow all instructions on the label. ? Do not take two or more pain medicines at the same time unless the doctor told you to. Many pain medicines have acetaminophen, which is Tylenol. Too much acetaminophen (Tylenol) can be harmful.     · If your doctor prescribed antibiotics, take them as directed. Do not stop taking them just because you feel better. You need to take the full course of antibiotics.     · If you think your pain medicine is making you sick to your stomach:  ? Take your medicine after meals (unless your doctor has told you not to). ? Ask your doctor for a different pain medicine. Incision care    · If you have strips of tape on the cut (incision) the doctor made, leave the tape on for a week or until it falls off.     · If you have staples closing the cut, you will need to visit your doctor in 1 to 2 weeks to have them removed.     · Wash the area daily with warm, soapy water and pat it dry. Follow-up care is a key part of your treatment and safety. Be sure to make and go to all appointments, and call your doctor if you are having problems. It's also a good idea to know your test results and keep a list of the medicines you take. When should you call for help? Call 911 anytime you think you may need emergency care.  For example, call if:    · You passed out (lost consciousness).     · You are short of breath.    Call your doctor now or seek immediate medical care if:    · You have pain that does not get better after you take pain medicine.     · You are sick to your stomach and cannot keep fluids down.     · You have signs of a blood clot in your leg (called a deep vein thrombosis), such as:  ? Pain in your calf, back of the knee, thigh, or groin. ? Redness and swelling in your leg or groin.     · You cannot pass stools or gas.     · Bright red blood has soaked through the bandage over your incision.     · You have loose stitches, or your incision comes open.     · You have signs of infection, such as:  ? Increased pain, swelling, warmth, or redness. ? Red streaks leading from the incision. ? Pus draining from the incision. ? A fever.    Watch closely for any changes in your health, and be sure to contact your doctor if you have any problems. Where can you learn more? Go to http://makayla-elicia.info/. Enter W703 in the search box to learn more about \"Hernia Repair: What to Expect at Home. \"  Current as of: March 27, 2018  Content Version: 11.9  © 4999-5672 [employer]. Care instructions adapted under license by [employer] (which disclaims liability or warranty for this information). If you have questions about a medical condition or this instruction, always ask your healthcare professional. [place] [address] any warranty or liability for your use of this information. DISCHARGE SUMMARY from Nurse    PATIENT INSTRUCTIONS:    After general anesthesia or intravenous sedation, for 24 hours or while taking prescription Narcotics:  · Limit your activities  · Do not drive and operate hazardous machinery  · Do not make important personal or business decisions  · Do  not drink alcoholic beverages  · If you have not urinated within 8 hours after discharge, please contact your surgeon on call.     Report the following to your surgeon:  · Excessive pain, swelling, redness or odor of or around the surgical area  · Temperature over 100.5  · Nausea and vomiting lasting longer than 4 hours or if unable to take medications  · Any signs of decreased circulation or nerve impairment to extremity: change in color, persistent  numbness, tingling, coldness or increase pain  · Any questions    What to do at Home:  Recommended activity: Activity as tolerated. If you experience any of the following symptoms:  Fever greater than 100.5,  Pain or nausea uncontrolled,  Redness/ warmth at incision site,  Drainage from incision site with a tan color/ foul oodor,  please follow up with your doctor. *  Please give a list of your current medications to your Primary Care Provider. *  Please update this list whenever your medications are discontinued, doses are      changed, or new medications (including over-the-counter products) are added. *  Please carry medication information at all times in case of emergency situations. These are general instructions for a healthy lifestyle:    No smoking/ No tobacco products/ Avoid exposure to second hand smoke  Surgeon General's Warning:  Quitting smoking now greatly reduces serious risk to your health. Obesity, smoking, and sedentary lifestyle greatly increases your risk for illness    A healthy diet, regular physical exercise & weight monitoring are important for maintaining a healthy lifestyle    You may be retaining fluid if you have a history of heart failure or if you experience any of the following symptoms:  Weight gain of 3 pounds or more overnight or 5 pounds in a week, increased swelling in our hands or feet or shortness of breath while lying flat in bed. Please call your doctor as soon as you notice any of these symptoms; do not wait until your next office visit. The discharge information has been reviewed with the patient. The patient verbalized understanding. Discharge medications reviewed with the patient and appropriate educational materials and side effects teaching were provided.   ___________________________________________________________________________________________________________________________________

## 2019-07-08 PROBLEM — Z90.49 S/P SMALL BOWEL RESECTION: Status: ACTIVE | Noted: 2019-07-08

## 2022-03-18 PROBLEM — I71.40 AAA (ABDOMINAL AORTIC ANEURYSM) WITHOUT RUPTURE (HCC): Status: ACTIVE | Noted: 2019-06-14

## 2022-03-18 PROBLEM — K45.8 INTERNAL HERNIA: Status: ACTIVE | Noted: 2019-06-18

## 2022-03-19 PROBLEM — D72.829 LEUKOCYTOSIS: Status: ACTIVE | Noted: 2019-06-13

## 2022-03-19 PROBLEM — K56.609 SBO (SMALL BOWEL OBSTRUCTION) (HCC): Status: ACTIVE | Noted: 2019-06-13

## 2022-03-19 PROBLEM — K44.9 HIATAL HERNIA: Status: ACTIVE | Noted: 2019-06-14

## 2022-03-19 PROBLEM — R34 OLIGURIA: Status: ACTIVE | Noted: 2019-06-18

## 2022-03-19 PROBLEM — K63.89 MESENTERIC MASS: Status: ACTIVE | Noted: 2019-06-18

## 2022-03-19 PROBLEM — R10.84 GENERALIZED ABDOMINAL PAIN: Status: ACTIVE | Noted: 2019-06-13

## 2022-03-20 PROBLEM — K52.9 ENTERITIS: Status: ACTIVE | Noted: 2019-06-13

## 2022-03-20 PROBLEM — Z90.49 S/P SMALL BOWEL RESECTION: Status: ACTIVE | Noted: 2019-07-08

## 2022-03-29 PROBLEM — I65.23 CAROTID STENOSIS, ASYMPTOMATIC, BILATERAL: Status: ACTIVE | Noted: 2022-03-29

## 2023-04-19 ENCOUNTER — TELEPHONE (OUTPATIENT)
Dept: VASCULAR SURGERY | Age: 68
End: 2023-04-19

## 2023-04-19 NOTE — TELEPHONE ENCOUNTER
No call no show. Clld regarding pt missed appt today 4/19/23. Pt stated he has other doctors appt with Cancer doct. Pt says will cb @ later time to reschd appt.
